# Patient Record
Sex: MALE | Race: WHITE | NOT HISPANIC OR LATINO | Employment: FULL TIME | URBAN - METROPOLITAN AREA
[De-identification: names, ages, dates, MRNs, and addresses within clinical notes are randomized per-mention and may not be internally consistent; named-entity substitution may affect disease eponyms.]

---

## 2017-07-24 ENCOUNTER — APPOINTMENT (OUTPATIENT)
Dept: LAB | Facility: CLINIC | Age: 52
End: 2017-07-24
Payer: COMMERCIAL

## 2017-07-24 ENCOUNTER — TRANSCRIBE ORDERS (OUTPATIENT)
Dept: URGENT CARE | Facility: CLINIC | Age: 52
End: 2017-07-24

## 2017-07-24 DIAGNOSIS — Z79.1 ENCOUNTER FOR LONG-TERM (CURRENT) USE OF NSAIDS: ICD-10-CM

## 2017-07-24 DIAGNOSIS — E56.9 UNSPECIFIED VITAMIN DEFICIENCY: ICD-10-CM

## 2017-07-24 DIAGNOSIS — R53.83 FATIGUE, UNSPECIFIED TYPE: Primary | ICD-10-CM

## 2017-07-24 DIAGNOSIS — N42.9 UNSPECIFIED DISORDER OF PROSTATE: ICD-10-CM

## 2017-07-24 LAB
25(OH)D3 SERPL-MCNC: 12.1 NG/ML (ref 30–100)
ALBUMIN SERPL BCP-MCNC: 3.6 G/DL (ref 3.5–5)
ALP SERPL-CCNC: 61 U/L (ref 46–116)
ALT SERPL W P-5'-P-CCNC: 23 U/L (ref 12–78)
ANION GAP SERPL CALCULATED.3IONS-SCNC: 10 MMOL/L (ref 4–13)
AST SERPL W P-5'-P-CCNC: 14 U/L (ref 5–45)
BILIRUB SERPL-MCNC: 0.34 MG/DL (ref 0.2–1)
BUN SERPL-MCNC: 29 MG/DL (ref 5–25)
CALCIUM SERPL-MCNC: 9.5 MG/DL (ref 8.3–10.1)
CHLORIDE SERPL-SCNC: 104 MMOL/L (ref 100–108)
CO2 SERPL-SCNC: 23 MMOL/L (ref 21–32)
CREAT SERPL-MCNC: 1.12 MG/DL (ref 0.6–1.3)
ERYTHROCYTE [DISTWIDTH] IN BLOOD BY AUTOMATED COUNT: 12.9 % (ref 11.6–15.1)
FOLATE SERPL-MCNC: 17 NG/ML (ref 3.1–17.5)
GFR SERPL CREATININE-BSD FRML MDRD: 75 ML/MIN/1.73SQ M
GLUCOSE SERPL-MCNC: 129 MG/DL (ref 65–140)
HCT VFR BLD AUTO: 42.2 % (ref 36.5–49.3)
HGB BLD-MCNC: 14.2 G/DL (ref 12–17)
MAGNESIUM SERPL-MCNC: 2.2 MG/DL (ref 1.6–2.6)
MCH RBC QN AUTO: 31 PG (ref 26.8–34.3)
MCHC RBC AUTO-ENTMCNC: 33.6 G/DL (ref 31.4–37.4)
MCV RBC AUTO: 92 FL (ref 82–98)
PLATELET # BLD AUTO: 188 THOUSANDS/UL (ref 149–390)
PMV BLD AUTO: 11.1 FL (ref 8.9–12.7)
POTASSIUM SERPL-SCNC: 4.5 MMOL/L (ref 3.5–5.3)
PROT SERPL-MCNC: 7.3 G/DL (ref 6.4–8.2)
PSA SERPL-MCNC: 0.3 NG/ML (ref 0–4)
RBC # BLD AUTO: 4.58 MILLION/UL (ref 3.88–5.62)
SODIUM SERPL-SCNC: 137 MMOL/L (ref 136–145)
T4 FREE SERPL-MCNC: 0.84 NG/DL (ref 0.76–1.46)
TSH SERPL DL<=0.05 MIU/L-ACNC: 0.85 UIU/ML (ref 0.36–3.74)
VIT B12 SERPL-MCNC: 241 PG/ML (ref 100–900)
WBC # BLD AUTO: 7.92 THOUSAND/UL (ref 4.31–10.16)

## 2017-07-24 PROCEDURE — 85027 COMPLETE CBC AUTOMATED: CPT

## 2017-07-24 PROCEDURE — 80053 COMPREHEN METABOLIC PANEL: CPT

## 2017-07-24 PROCEDURE — 84439 ASSAY OF FREE THYROXINE: CPT

## 2017-07-24 PROCEDURE — 82306 VITAMIN D 25 HYDROXY: CPT

## 2017-07-24 PROCEDURE — 84425 ASSAY OF VITAMIN B-1: CPT

## 2017-07-24 PROCEDURE — 84207 ASSAY OF VITAMIN B-6: CPT

## 2017-07-24 PROCEDURE — 82607 VITAMIN B-12: CPT

## 2017-07-24 PROCEDURE — 84443 ASSAY THYROID STIM HORMONE: CPT

## 2017-07-24 PROCEDURE — 84255 ASSAY OF SELENIUM: CPT

## 2017-07-24 PROCEDURE — 83735 ASSAY OF MAGNESIUM: CPT

## 2017-07-24 PROCEDURE — 36415 COLL VENOUS BLD VENIPUNCTURE: CPT

## 2017-07-24 PROCEDURE — G0103 PSA SCREENING: HCPCS

## 2017-07-24 PROCEDURE — 82746 ASSAY OF FOLIC ACID SERUM: CPT

## 2017-07-25 LAB — SELENIUM SERPL-MCNC: 169 UG/L (ref 79–326)

## 2017-07-27 LAB
VIT B1 BLD-SCNC: 190.9 NMOL/L (ref 66.5–200)
VIT B6 SERPL-MCNC: 19 UG/L (ref 5.3–46.7)

## 2018-01-19 ENCOUNTER — OFFICE VISIT (OUTPATIENT)
Dept: URGENT CARE | Facility: CLINIC | Age: 53
End: 2018-01-19
Payer: COMMERCIAL

## 2018-01-19 PROCEDURE — 99213 OFFICE O/P EST LOW 20 MIN: CPT

## 2018-01-23 VITALS
OXYGEN SATURATION: 97 % | HEART RATE: 105 BPM | DIASTOLIC BLOOD PRESSURE: 76 MMHG | TEMPERATURE: 99.6 F | RESPIRATION RATE: 18 BRPM | SYSTOLIC BLOOD PRESSURE: 122 MMHG

## 2018-01-23 NOTE — PROGRESS NOTES
Assessment    1  Acute frontal sinusitis (461 1) (J01 10)    Plan  Acute frontal sinusitis    · Amoxicillin-Pot Clavulanate 875-125 MG Oral Tablet; TAKE 1 TABLET EVERY 12  HOURS DAILY    Discussion/Summary  Discussion Summary: This has diagnosis of sinusitis we will treat with Augmentin p  o  b i d  for 10 days and follow up with PCP in 3-5 days  Medication Side Effects Reviewed: Possible side effects of new medications were reviewed with the patient/guardian today  Understands and agrees with treatment plan: The treatment plan was reviewed with the patient/guardian  The patient/guardian understands and agrees with the treatment plan   Counseling Documentation With Imm: The patient was counseled regarding instructions for management, patient and family education, importance of compliance with treatment  total time of encounter was 25 minutes and 10 minutes was spent counseling  Follow Up Instructions: Follow Up with your Primary Care Provider in 3-5 days  If your symptoms worsen, go to the nearest [place] [address] Emergency Department  Chief Complaint    1  Cold Symptoms  Chief Complaint Free Text Note Form: C/O cough, post nasal drip, tight in the chest, burning in the chest x 2 days  Pt did not have the Flu vaccine  History of Present Illness  HPI: 58-year-old male at urgent care with chief complaint of nasal congestion sinus pressure postnasal drainage cough for the past 4 days denies any fevers or chills   Hospital Based Practices Required Assessment:   Pain Assessment   the patient states they do not have pain  (on a scale of 0 to 10, the patient rates the pain at 0 )   Abuse And Domestic Violence Screen    Yes, the patient is safe at home  The patient states no one is hurting them  Depression And Suicide Screen  No, the patient has not had thoughts of hurting themself  No, the patient has not felt depressed in the past 7 days  Readiness To Learn: Receptive     Barriers To Learning: none    Preferred Learning: verbal   Education Completed: disease/condition, medications and further treatment/follow-up   Teaching Method: verbal   Person Taught: patient   Evaluation Of Learning: verbalized/demonstrated understanding   Cold Symptoms: Lola Hamper presents with complaints of cold symptoms  Associated symptoms include nasal congestion, runny nose, post nasal drainage, scratchy throat, dry cough and facial pressure, but no sneezing, no sore throat, no hoarseness, no productive cough, no facial pain, no headache, no plugged ear(s), no ear pain, no swollen lymph nodes, no wheezing, no shortness of breath, no fatigue, no weakness, no nausea, no vomiting, no diarrhea, no fever and no chills  Review of Systems  Focused-Male:   Constitutional: no fever or chills, feels well, no tiredness, no recent weight loss or weight gain  ENT: nasal discharge, but as noted in HPI  Cardiovascular: no complaints of slow or fast heart rate, no chest pain, no palpitations, no leg claudication or lower extremity edema  Respiratory: cough and PND, but as noted in HPI  Gastrointestinal: no complaints of abdominal pain, no constipation, no nausea or vomiting, no diarrhea or bloody stools  Genitourinary: no complaints of dysuria or incontinence, no hesitancy, no nocturia, no genital lesion, no inadequacy of penile erection  Musculoskeletal: no complaints of arthralgia, no myalgia, no joint swelling or stiffness, no limb pain or swelling  Integumentary: no complaints of skin rash or lesion, no itching or dry skin, no skin wounds  Neurological: no complaints of headache, no confusion, no numbness or tingling, no dizziness or fainting  ROS Reviewed:   ROS reviewed  Active Problems    1  Allergic rhinitis (477 9) (J30 9)   2  Anxiety (300 00) (F41 9)   3  Diabetes mellitus (250 00) (E11 9)   4  Hematuria (599 70) (R31 9)    Past Medical History    1  History of Anxiety (300 00) (F41 9)   2   History of Denial Of Any Significant Medical History   3  History of acute pharyngitis (V12 69) (Z87 09)   4  History of acute sinusitis (V12 69) (Z87 09)   5  History of allergy (V15 09) (Z88 9)   6  History of esophageal reflux (V12 79) (Z87 19)   7  History of sleep apnea (V13 89) (Z86 69)   8  History of Lightheadedness (780 4) (R42)   9  History of Skin rash (782 1) (R21)  Active Problems And Past Medical History Reviewed: The active problems and past medical history were reviewed and updated today  Family History  Family History    1  Family history of Diabetes Mellitus (V18 0)  Family History Reviewed: The family history was reviewed and updated today  Social History    · Denied: History of Current Every Day Smoker   · Former smoker (T66 96) (F58 900)  Social History Reviewed: The social history was reviewed and updated today  The social history was reviewed and is unchanged  Surgical History    1  History of Appendectomy   2  History of Sinus Surgery   3  History of Sinus Surgery  Surgical History Reviewed: The surgical history was reviewed and updated today  Current Meds   1  Lexapro TABS; Therapy: (Recorded:15Mgv9782) to Recorded  Medication List Reviewed: The medication list was reviewed and updated today  Allergies    1  No Known Drug Allergies    2  Animal dander   3  Dust   4  Mold   5  Seasonal   6  Trees    Vitals  Signs   Recorded: [id number] 01:40PM   Temperature: 99 6 F  Heart Rate: 105  Respiration: 18  Systolic: 408  Diastolic: 76  O2 Saturation: 97    Physical Exam    Constitutional   General appearance: No acute distress, well appearing and well nourished  Eyes   Conjunctiva and lids: No swelling, erythema, or discharge  Pupils and irises: Equal, round and reactive to light  Ears, Nose, Mouth, and Throat   External inspection of ears and nose: Normal     Otoscopic examination: Tympanic membrance translucent with normal light reflex   Canals patent without erythema  Nasal mucosa, septum, and turbinates: Abnormal   normal nasal septum, no intranasal masses or polyps and normal nasal turbinates  There was a purulent discharge from both nares  The bilateral nasal mucosa was boggy  Oropharynx: Abnormal   PND  Pulmonary   Respiratory effort: No increased work of breathing or signs of respiratory distress  Auscultation of lungs: Clear to auscultation  Cardiovascular   Palpation of heart: Normal PMI, no thrills  Auscultation of heart: Normal rate and rhythm, normal S1 and S2, without murmurs  Lymphatic   Palpation of lymph nodes in neck: No lymphadenopathy      Musculoskeletal   Gait and station: Normal     Psychiatric   Orientation to person, place and time: Normal     Mood and affect: Normal        Signatures   Electronically signed by : Gita Chou NP; Jan 19 2018  1:57PM EST                       (Author)

## 2018-05-05 ENCOUNTER — OFFICE VISIT (OUTPATIENT)
Dept: URGENT CARE | Facility: CLINIC | Age: 53
End: 2018-05-05
Payer: COMMERCIAL

## 2018-05-05 VITALS
SYSTOLIC BLOOD PRESSURE: 125 MMHG | RESPIRATION RATE: 16 BRPM | TEMPERATURE: 97.8 F | HEART RATE: 82 BPM | OXYGEN SATURATION: 100 % | DIASTOLIC BLOOD PRESSURE: 68 MMHG

## 2018-05-05 DIAGNOSIS — J06.9 ACUTE URI: Primary | ICD-10-CM

## 2018-05-05 PROCEDURE — 99213 OFFICE O/P EST LOW 20 MIN: CPT | Performed by: PHYSICIAN ASSISTANT

## 2018-05-05 RX ORDER — AZITHROMYCIN 250 MG/1
TABLET, FILM COATED ORAL
Qty: 6 TABLET | Refills: 0 | Status: SHIPPED | OUTPATIENT
Start: 2018-05-05 | End: 2018-05-10

## 2018-05-05 RX ORDER — ESCITALOPRAM OXALATE 20 MG/1
20 TABLET ORAL
COMMUNITY
Start: 2014-05-27

## 2018-05-05 NOTE — PROGRESS NOTES
[facility]    NAME: Joselyn Ramirez is a 47 y o  male  : 1965    MRN: [id number]  DATE: May 5, 2018  TIME: 9:31 AM    Assessment and Plan   Acute URI [J06 9]  1  Acute URI  azithromycin (ZITHROMAX) 250 mg tablet       Patient Instructions     Patient Instructions   I have prescribed an antibiotic for the infection  Please take the antibiotic as prescribed and finish the entire prescription  I recommend that the patient takes an over the counter probiotic or eats yogurt with live cultures in it [place]) to keep good bacteria in the gut and help prevent diarrhea  Wash hands frequently to prevent the spread of infection  Can use over the counter cough and cold medications to help with symptoms, recommend zyrtec  Ibuprofen and/or tylenol as needed for pain or fever  If not improving over the next 7-10 days, follow up with PCP  Chief Complaint     Chief Complaint   Patient presents with    Cold Like Symptoms     x 2 days    Cough    Sore Throat       History of Present Illness   54-year-old male here with complaint of sore throat, nasal congestion and sinus pressure for the last couple days  Patient states that he has been using a nasal wash/Neti pot and it has been giving him some relief but feels like he is getting worse  He denies any fever or chills  Nasal mucous is thick  Review of Systems   Review of Systems   Constitutional: Positive for fatigue  Negative for activity change, appetite change, chills, diaphoresis, fever and unexpected weight change  HENT: Positive for congestion, sinus pressure and sore throat  Negative for ear pain, hearing loss, sneezing and tinnitus  Eyes: Negative for photophobia, redness and visual disturbance  Respiratory: Positive for cough  Negative for apnea, chest tightness, shortness of breath, wheezing and stridor  Cardiovascular: Negative for chest pain, palpitations and leg swelling     Gastrointestinal: Negative for abdominal distention, abdominal pain, blood in stool, constipation, diarrhea, nausea and vomiting  Endocrine: Negative for cold intolerance, heat intolerance, polydipsia, polyphagia and polyuria  Genitourinary: Negative for difficulty urinating, dysuria, flank pain, hematuria and urgency  Musculoskeletal: Negative for arthralgias, back pain, gait problem, myalgias, neck pain and neck stiffness  Skin: Negative for rash and wound  Neurological: Negative for dizziness, tremors, seizures, syncope, weakness, light-headedness, numbness and headaches  Hematological: Negative for adenopathy  Does not bruise/bleed easily  Psychiatric/Behavioral: Negative for agitation, behavioral problems, confusion and decreased concentration  The patient is not nervous/anxious  All other systems reviewed and are negative  Current Medications     Current Outpatient Prescriptions:     escitalopram (LEXAPRO) 20 mg tablet, Take 20 mg by mouth, Disp: , Rfl:     azithromycin (ZITHROMAX) 250 mg tablet, Take 2 tablets today then 1 tablet daily for 4 days, Disp: 6 tablet, Rfl: 0    Current Allergies     Allergies as of 05/05/2018 - Reviewed 05/05/2018   Allergen Reaction Noted    Pollen extract  12/26/2013          The following portions of the patient's history were reviewed and updated as appropriate: allergies, current medications, past family history, past medical history, past social history, past surgical history and problem list    Past Medical History:   Diagnosis Date    Anxiety     Depression      No past surgical history on file  No family history on file  Medications have been verified  Objective   /68   Pulse 82   Temp 97 8 °F (36 6 °C)   Resp 16   SpO2 100%      Physical Exam   Physical Exam   Constitutional: He appears well-developed and well-nourished  No distress  HENT:   Head: Normocephalic     Right Ear: Tympanic membrane and external ear normal    Left Ear: Tympanic membrane and external ear normal    Nose: Mucosal edema present  Right sinus exhibits maxillary sinus tenderness and frontal sinus tenderness  Left sinus exhibits maxillary sinus tenderness and frontal sinus tenderness  Mouth/Throat: Posterior oropharyngeal erythema present  No oropharyngeal exudate  Neck: Normal range of motion  Neck supple  Cardiovascular: Normal rate, regular rhythm and normal heart sounds  No murmur heard  Pulmonary/Chest: Effort normal and breath sounds normal  No respiratory distress  He has no wheezes  He has no rales  Abdominal: Soft  Bowel sounds are normal  There is no tenderness  Musculoskeletal: Normal range of motion  Lymphadenopathy:     He has no cervical adenopathy  Skin: Skin is warm  No rash noted

## 2018-05-05 NOTE — PATIENT INSTRUCTIONS
I have prescribed an antibiotic for the infection  Please take the antibiotic as prescribed and finish the entire prescription  I recommend that the patient takes an over the counter probiotic or eats yogurt with live cultures in it [place]) to keep good bacteria in the gut and help prevent diarrhea  Wash hands frequently to prevent the spread of infection  Can use over the counter cough and cold medications to help with symptoms, recommend zyrtec  Ibuprofen and/or tylenol as needed for pain or fever  If not improving over the next 7-10 days, follow up with PCP

## 2018-06-04 ENCOUNTER — OFFICE VISIT (OUTPATIENT)
Dept: URGENT CARE | Facility: CLINIC | Age: 53
End: 2018-06-04
Payer: COMMERCIAL

## 2018-06-04 VITALS
OXYGEN SATURATION: 96 % | SYSTOLIC BLOOD PRESSURE: 116 MMHG | WEIGHT: 250 LBS | BODY MASS INDEX: 35 KG/M2 | DIASTOLIC BLOOD PRESSURE: 80 MMHG | RESPIRATION RATE: 20 BRPM | TEMPERATURE: 100.7 F | HEIGHT: 71 IN | HEART RATE: 104 BPM

## 2018-06-04 DIAGNOSIS — J06.9 ACUTE URI: Primary | ICD-10-CM

## 2018-06-04 PROCEDURE — 99213 OFFICE O/P EST LOW 20 MIN: CPT | Performed by: PHYSICIAN ASSISTANT

## 2018-06-04 RX ORDER — BENZONATATE 100 MG/1
100 CAPSULE ORAL 3 TIMES DAILY PRN
Qty: 20 CAPSULE | Refills: 0 | Status: SHIPPED | OUTPATIENT
Start: 2018-06-04

## 2018-06-04 RX ORDER — AZITHROMYCIN 250 MG/1
TABLET, FILM COATED ORAL
Qty: 6 TABLET | Refills: 0 | Status: SHIPPED | OUTPATIENT
Start: 2018-06-04 | End: 2018-06-08 | Stop reason: ALTCHOICE

## 2018-06-04 NOTE — PROGRESS NOTES
[facility]    NAME: Nathaniel Mann is a 46 y o  male  : 1965    MRN: [id number]  DATE: 2018  TIME: 8:48 AM    Assessment and Plan   Acute URI [J06 9]  1  Acute URI  azithromycin (ZITHROMAX) 250 mg tablet    benzonatate (TESSALON PERLES) 100 mg capsule       Patient Instructions     Patient Instructions   I have prescribed an antibiotic for the infection  Please take the antibiotic as prescribed and finish the entire prescription  I recommend that the patient takes an over the counter probiotic or eats yogurt with live cultures in it [place]) to keep good bacteria in the gut and help prevent diarrhea  Wash hands frequently to prevent the spread of infection  Can use over the counter cough and cold medications to help with symptoms  Ibuprofen and/or tylenol as needed for pain or fever  If not improving over the next 7-10 days, follow up with PCP  Chief Complaint     Chief Complaint   Patient presents with    Cold Like Symptoms     C/O sinus congestion, post nasal drip, cough , body aches and chills x 2 days  History of Present Illness   27-year-old male here with complaint of upper respiratory symptoms  Patient is a fever, sinus pressure and nasal congestion  Symptoms started 3 days ago  Has a productive cough with thick mucus  And sore throat  Review of Systems   Review of Systems   Constitutional: Positive for chills and fever  Negative for activity change, appetite change, diaphoresis, fatigue and unexpected weight change  HENT: Positive for congestion, postnasal drip, sinus pressure and sore throat  Negative for ear pain, hearing loss, sneezing and tinnitus  Eyes: Negative for photophobia, redness and visual disturbance  Respiratory: Positive for cough  Negative for apnea, chest tightness, shortness of breath, wheezing and stridor  Cardiovascular: Negative for chest pain, palpitations and leg swelling     Gastrointestinal: Negative for abdominal distention, abdominal pain, blood in stool, constipation, diarrhea, nausea and vomiting  Endocrine: Negative for cold intolerance, heat intolerance, polydipsia, polyphagia and polyuria  Genitourinary: Negative for difficulty urinating, dysuria, flank pain, hematuria and urgency  Musculoskeletal: Negative for arthralgias, back pain, gait problem, myalgias, neck pain and neck stiffness  Skin: Negative for rash and wound  Neurological: Negative for dizziness, tremors, seizures, syncope, weakness, light-headedness, numbness and headaches  Hematological: Negative for adenopathy  Does not bruise/bleed easily  Psychiatric/Behavioral: Negative for agitation, behavioral problems, confusion and decreased concentration  The patient is not nervous/anxious  All other systems reviewed and are negative  Current Medications     Current Outpatient Prescriptions:     azithromycin (ZITHROMAX) 250 mg tablet, Take 2 tablets today then 1 tablet daily for 4 days, Disp: 6 tablet, Rfl: 0    benzonatate (TESSALON PERLES) 100 mg capsule, Take 1 capsule (100 mg total) by mouth 3 (three) times a day as needed for cough, Disp: 20 capsule, Rfl: 0    escitalopram (LEXAPRO) 20 mg tablet, Take 20 mg by mouth, Disp: , Rfl:     Current Allergies     Allergies as of 06/04/2018 - Reviewed 06/04/2018   Allergen Reaction Noted    Pollen extract  12/26/2013          The following portions of the patient's history were reviewed and updated as appropriate: allergies, current medications, past family history, past medical history, past social history, past surgical history and problem list    Past Medical History:   Diagnosis Date    Anxiety     Depression     Diabetes mellitus ([facility] [address] )      Past Surgical History:   Procedure Laterality Date    APPENDECTOMY      NASAL POLYP EXCISION      x 2      No family history on file  Medications have been verified      Objective   /80   Pulse 104   Temp (!) 100 7 °F (38 2 °C) (Tympanic)   Resp 20   Ht 5' 11" (1 803 m)   Wt 113 kg (250 lb)   SpO2 96%   BMI 34 87 kg/m²      Physical Exam   Physical Exam   Constitutional: He appears well-developed and well-nourished  No distress  HENT:   Head: Normocephalic  Right Ear: Tympanic membrane and external ear normal    Left Ear: Tympanic membrane and external ear normal    Nose: Mucosal edema present  Right sinus exhibits maxillary sinus tenderness  Mouth/Throat: Posterior oropharyngeal erythema present  No oropharyngeal exudate  Neck: Normal range of motion  Neck supple  Cardiovascular: Normal rate, regular rhythm and normal heart sounds  No murmur heard  Pulmonary/Chest: Effort normal and breath sounds normal  No respiratory distress  He has no wheezes  He has no rales  Abdominal: Soft  Bowel sounds are normal  There is no tenderness  Musculoskeletal: Normal range of motion  Lymphadenopathy:     He has no cervical adenopathy  Skin: Skin is warm  No rash noted

## 2018-06-08 ENCOUNTER — OFFICE VISIT (OUTPATIENT)
Dept: URGENT CARE | Facility: CLINIC | Age: 53
End: 2018-06-08
Payer: COMMERCIAL

## 2018-06-08 ENCOUNTER — APPOINTMENT (OUTPATIENT)
Dept: RADIOLOGY | Facility: CLINIC | Age: 53
End: 2018-06-08
Payer: COMMERCIAL

## 2018-06-08 VITALS
WEIGHT: 250 LBS | HEIGHT: 71 IN | SYSTOLIC BLOOD PRESSURE: 130 MMHG | RESPIRATION RATE: 20 BRPM | DIASTOLIC BLOOD PRESSURE: 80 MMHG | BODY MASS INDEX: 35 KG/M2 | HEART RATE: 77 BPM | TEMPERATURE: 98 F | OXYGEN SATURATION: 100 %

## 2018-06-08 DIAGNOSIS — R05.9 COUGH: ICD-10-CM

## 2018-06-08 DIAGNOSIS — J40 BRONCHITIS: ICD-10-CM

## 2018-06-08 DIAGNOSIS — R05.9 COUGH: Primary | ICD-10-CM

## 2018-06-08 PROCEDURE — 99213 OFFICE O/P EST LOW 20 MIN: CPT | Performed by: FAMILY MEDICINE

## 2018-06-08 PROCEDURE — 71046 X-RAY EXAM CHEST 2 VIEWS: CPT

## 2018-06-08 RX ORDER — IPRATROPIUM BROMIDE AND ALBUTEROL SULFATE 2.5; .5 MG/3ML; MG/3ML
3 SOLUTION RESPIRATORY (INHALATION)
Status: SHIPPED | OUTPATIENT
Start: 2018-06-08

## 2018-06-08 RX ORDER — ALBUTEROL SULFATE 90 UG/1
2 AEROSOL, METERED RESPIRATORY (INHALATION) EVERY 4 HOURS PRN
Qty: 1 INHALER | Refills: 0 | Status: SHIPPED | OUTPATIENT
Start: 2018-06-08 | End: 2018-07-08

## 2018-06-08 RX ORDER — LEVOFLOXACIN 500 MG/1
500 TABLET, FILM COATED ORAL DAILY
Qty: 10 TABLET | Refills: 0 | Status: SHIPPED | OUTPATIENT
Start: 2018-06-08 | End: 2018-06-18

## 2018-06-08 RX ADMIN — IPRATROPIUM BROMIDE AND ALBUTEROL SULFATE 3 ML: 2.5; .5 SOLUTION RESPIRATORY (INHALATION) at 08:56

## 2018-06-08 RX ADMIN — IPRATROPIUM BROMIDE AND ALBUTEROL SULFATE 3 ML: 2.5; .5 SOLUTION RESPIRATORY (INHALATION) at 08:54

## 2018-06-08 NOTE — PROGRESS NOTES
[facility] - Patient Visit Note  Cuauhtemoc Wilcox 46 y o  male MRN: [id number]      Assessment / Plan:  Cough [R05]  1  Cough  XR chest pa & lateral     Reason For Visit / Chief Complaint  Chief Complaint   Patient presents with    Cold Like Symptoms     started monday             Georgette Ormond Discussion:  Patient is being treated for early left lower lobe pneumonia please see instructions     HPI:  Cuauhtemoc Wilcox is a 46 y o  male Patient           Who  Presents after having been seen recently and put on azithromycin  He was also on [employer]  Since that time will was an upper respiratory illness has come down to his chest   He is now hearing what  he describes as rattling  He is aware of wheezing- which is not common for him and states that even small amounts of activity creates shortness of breath  He does He denie recall that 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 and there were several people with pneumonia there  He was also attic doing photography where he was sweating for several hours in extremely warm area  His sputum is minimal but when it is present is yellowish in nature  There is no history of asthma in the past   He denies any pleuritic pain fever or chills  He is allergic to no foods or medications medication list is reviewed    Chest x-ray will be obtained today based on physical exam        Problem List     Anxiety    Depressive disorder    Diabetes mellitus ([facility] [address] )    Esophageal reflux    Hematuria    Panic disorder    Type II diabetes mellitus, uncontrolled ([address] )    Type II or unspecified type diabetes mellitus without mention of complication, uncontrolled          Historical Information   Past Medical History:   Diagnosis Date    Anxiety     Depression     Diabetes mellitus ([facility] [address] )      Past Surgical History:   Procedure Laterality Date    APPENDECTOMY      NASAL POLYP EXCISION      x 2      Social History   History   Alcohol use Not on file     History   Drug use: Unknown     History   Smoking Status    Former Smoker   Smokeless Tobacco    Never Used     No family history on file        ALLERGIES:         Allergies   Allergen Reactions    Pollen Extract        MEDS:    Current Outpatient Prescriptions:     benzonatate (TESSALON PERLES) 100 mg capsule, Take 1 capsule (100 mg total) by mouth 3 (three) times a day as needed for cough, Disp: 20 capsule, Rfl: 0    escitalopram (LEXAPRO) 20 mg tablet, Take 20 mg by mouth, Disp: , Rfl:     FACILITY ADMINISTERED MEDS:        REVIEW OF SYSTEMS    GENERAL: NEGATIVE for:  Generalized Fatigue                             Chills                              Fever                             Myalgias     OPTHALMIC: NEGATIVE for:  Diplopia                            Scotomata                            Visual Changes                            Blurred Vision     ENT:  [address] for:  Hearing Difficulty                            Tinnitus                            Vertigo                            Dizziness                            Ear Pain                            Ear Drainage               NOSE NEGATIVE for:  Nasal Congestion                            Nasal Discharge                            Sinus Pain / Pressure               THROAT NEGATIVE for:  Sore Throat / Throat Pain                            Difficulty Swallowing     RESPIRATORY: Positive for:  Cough                            Wheezing                                                        Sob / Tachypnea                              CARDIOVASCULAR: NEGATIVE for:  Chest Pain                             SOB (cardiac Related)                             Dyspnea on Exertion                             Orthopnea                             PND                             Leg Edema                             Palpitations Irregularities/rythym                       CURRENT VITALS:   Blood Pressure: 130/80 (06/08/18 0818)  Pulse: 77 (06/08/18 0818)  Temperature: 98 °F (36 7 °C) (06/08/18 0818)  Temp Source: Tympanic (06/08/18 0818)  Respirations: 20 (06/08/18 0818)  Height: 5' 11" (180 3 cm) (06/08/18 0818)  Weight - Scale: 113 kg (250 lb) (06/08/18 0818)  SpO2: 100 % (06/08/18 0818)  /80 (BP Location: Right arm, Patient Position: Sitting, Cuff Size: Large)   Pulse 77   Temp 98 °F (36 7 °C) (Tympanic)   Resp 20   Ht 5' 11" (1 803 m)   Wt 113 kg (250 lb)   SpO2 100%   BMI 34 87 kg/m²       PHYSICAL EXAM:         General Appearance:    Alert, cooperative, no apparent distress, appears stated age     Oriented x3    Head:    Normocephalic, without obvious abnormality, atraumatic   Eyes:      EOM's intact,      JOSE,        conjunctiva/corneas clear,          fundi not visualized well   Ears:     Normal external ear canals     Tm right side  Normal     Tm left side    Normal       Nose:   Nares normal externally, septum midline,     mucosa normal,     No anterior drainage         Sinuses   with out   tenderness to palpation / percussion     Throat:   Lips, mucosa, and tongue normal       Anterior pharynx   Normal      Posterior pharynx moderate amount of postnasal drip greenish in nature     No exudate obvious       Neck:   Supple, symmetrical, trachea midline and moveable    Normal thyroid click present    No carotid bruits appreciated        Lymphatics:     Adenopathy in anterior cervical chain  Normal    Adenopathy in posterior cervical chain   Normal     Lungs:   Bilateral inspiratory rhonchi are noted at the bases wheezing is noted throughout all lung fields no rales are appreciated     Heart[de-identified]  Regular rate and rhythm, S1 and S2 normal,     No S3, S4, audible    No murmurs, rubs      Extremities:     Extremities grossly normal     atraumatic,     no cyanosis or edema        Skin:     Skin color, texture, turgor normal, no rashes or lesions                         Follow up at primary care in 2  days    Counseling / Coordination of Care  Total clinic time spent today  15  minutes  Greater than 50% of total time was spent with the patient and / or family counseling and / or coordination of care  Portions of the record may have been created with voice recognition software   Occasional wrong word or "sound a like" substitutions may have occurred due to the inherent limitations of voice recognition software   Read the chart carefully and recognize, using context, where substitutions have occurred

## 2018-06-08 NOTE — PATIENT INSTRUCTIONS
Chest x-ray was read by Radiology with the [facility] rotation that there is early and developing left lower lobe pneumonia  Please continue to take the Levaquin (new antibiotic) once a day for 10 full days  Discontinue the azithromycin  Discontinue the [employer]  Use your inhaler 4 times a day for several days and then p r n  as needed if her symptoms become worse in the next 48 hours please see got the emergency room and/or primary care for re-evaluation

## 2018-08-28 ENCOUNTER — APPOINTMENT (OUTPATIENT)
Dept: LAB | Facility: CLINIC | Age: 53
End: 2018-08-28
Payer: COMMERCIAL

## 2018-08-28 ENCOUNTER — TRANSCRIBE ORDERS (OUTPATIENT)
Dept: LAB | Facility: CLINIC | Age: 53
End: 2018-08-28

## 2018-08-28 DIAGNOSIS — R42 VERTIGO: ICD-10-CM

## 2018-08-28 DIAGNOSIS — E11.9 TYPE 2 DIABETES MELLITUS WITHOUT COMPLICATION, WITHOUT LONG-TERM CURRENT USE OF INSULIN (HCC): Primary | ICD-10-CM

## 2018-08-28 DIAGNOSIS — R21 RASH: ICD-10-CM

## 2018-08-28 DIAGNOSIS — N42.9 DISEASE OF PROSTATE: ICD-10-CM

## 2018-08-28 DIAGNOSIS — Z79.1 ENCOUNTER FOR LONG-TERM (CURRENT) USE OF NON-STEROIDAL ANTI-INFLAMMATORIES: ICD-10-CM

## 2018-08-28 LAB
ALBUMIN SERPL BCP-MCNC: 3.5 G/DL (ref 3.5–5)
ALP SERPL-CCNC: 62 U/L (ref 46–116)
ALT SERPL W P-5'-P-CCNC: 21 U/L (ref 12–78)
ANION GAP SERPL CALCULATED.3IONS-SCNC: 8 MMOL/L (ref 4–13)
AST SERPL W P-5'-P-CCNC: 13 U/L (ref 5–45)
BILIRUB SERPL-MCNC: 0.45 MG/DL (ref 0.2–1)
BUN SERPL-MCNC: 31 MG/DL (ref 5–25)
CALCIUM SERPL-MCNC: 9.3 MG/DL (ref 8.3–10.1)
CHLORIDE SERPL-SCNC: 108 MMOL/L (ref 100–108)
CO2 SERPL-SCNC: 24 MMOL/L (ref 21–32)
CREAT SERPL-MCNC: 1.19 MG/DL (ref 0.6–1.3)
ERYTHROCYTE [DISTWIDTH] IN BLOOD BY AUTOMATED COUNT: 13.3 % (ref 11.6–15.1)
ERYTHROCYTE [SEDIMENTATION RATE] IN BLOOD: 11 MM/HOUR (ref 0–10)
GFR SERPL CREATININE-BSD FRML MDRD: 69 ML/MIN/1.73SQ M
GLUCOSE P FAST SERPL-MCNC: 107 MG/DL (ref 65–99)
HCT VFR BLD AUTO: 39.9 % (ref 36.5–49.3)
HGB BLD-MCNC: 13 G/DL (ref 12–17)
MCH RBC QN AUTO: 31 PG (ref 26.8–34.3)
MCHC RBC AUTO-ENTMCNC: 32.6 G/DL (ref 31.4–37.4)
MCV RBC AUTO: 95 FL (ref 82–98)
PLATELET # BLD AUTO: 193 THOUSANDS/UL (ref 149–390)
PMV BLD AUTO: 11 FL (ref 8.9–12.7)
POTASSIUM SERPL-SCNC: 4.7 MMOL/L (ref 3.5–5.3)
PROT SERPL-MCNC: 7.1 G/DL (ref 6.4–8.2)
PSA SERPL-MCNC: 0.2 NG/ML (ref 0–4)
RBC # BLD AUTO: 4.19 MILLION/UL (ref 3.88–5.62)
SODIUM SERPL-SCNC: 140 MMOL/L (ref 136–145)
WBC # BLD AUTO: 6.3 THOUSAND/UL (ref 4.31–10.16)

## 2018-08-28 PROCEDURE — 80053 COMPREHEN METABOLIC PANEL: CPT

## 2018-08-28 PROCEDURE — 85027 COMPLETE CBC AUTOMATED: CPT

## 2018-08-28 PROCEDURE — 86618 LYME DISEASE ANTIBODY: CPT

## 2018-08-28 PROCEDURE — 84403 ASSAY OF TOTAL TESTOSTERONE: CPT

## 2018-08-28 PROCEDURE — 84443 ASSAY THYROID STIM HORMONE: CPT

## 2018-08-28 PROCEDURE — 84550 ASSAY OF BLOOD/URIC ACID: CPT

## 2018-08-28 PROCEDURE — 84439 ASSAY OF FREE THYROXINE: CPT

## 2018-08-28 PROCEDURE — G0103 PSA SCREENING: HCPCS

## 2018-08-28 PROCEDURE — 80061 LIPID PANEL: CPT

## 2018-08-28 PROCEDURE — 85652 RBC SED RATE AUTOMATED: CPT

## 2018-08-28 PROCEDURE — 36415 COLL VENOUS BLD VENIPUNCTURE: CPT

## 2018-08-29 LAB
B BURGDOR IGG SER IA-ACNC: 0.31
B BURGDOR IGM SER IA-ACNC: 0.3

## 2018-08-30 ENCOUNTER — APPOINTMENT (OUTPATIENT)
Dept: LAB | Facility: CLINIC | Age: 53
End: 2018-08-30
Payer: COMMERCIAL

## 2018-08-30 ENCOUNTER — TRANSCRIBE ORDERS (OUTPATIENT)
Dept: LAB | Facility: CLINIC | Age: 53
End: 2018-08-30

## 2018-08-30 DIAGNOSIS — E34.9 TESTOSTERONE DEFICIENCY: ICD-10-CM

## 2018-08-30 DIAGNOSIS — E11.9 TYPE 2 DIABETES MELLITUS WITHOUT COMPLICATION, WITHOUT LONG-TERM CURRENT USE OF INSULIN (HCC): Primary | ICD-10-CM

## 2018-08-30 DIAGNOSIS — M10.9 GOUT, UNSPECIFIED CAUSE, UNSPECIFIED CHRONICITY, UNSPECIFIED SITE: ICD-10-CM

## 2018-08-30 DIAGNOSIS — E03.9 HYPOTHYROIDISM, UNSPECIFIED TYPE: ICD-10-CM

## 2018-08-30 DIAGNOSIS — R53.83 FATIGUE, UNSPECIFIED TYPE: ICD-10-CM

## 2018-08-30 LAB
CHOLEST SERPL-MCNC: 156 MG/DL (ref 50–200)
CREAT UR-MCNC: 75.9 MG/DL
HDLC SERPL-MCNC: 50 MG/DL (ref 40–60)
LDLC SERPL CALC-MCNC: 62 MG/DL (ref 0–100)
MICROALBUMIN UR-MCNC: <5 MG/L (ref 0–20)
MICROALBUMIN/CREAT 24H UR: <7 MG/G CREATININE (ref 0–30)
NONHDLC SERPL-MCNC: 106 MG/DL
T4 FREE SERPL-MCNC: 0.83 NG/DL (ref 0.76–1.46)
TESTOST SERPL-MCNC: 155 NG/DL (ref 95–948)
TRIGL SERPL-MCNC: 221 MG/DL
TSH SERPL DL<=0.05 MIU/L-ACNC: 0.67 UIU/ML (ref 0.36–3.74)
URATE SERPL-MCNC: 12.1 MG/DL (ref 4.2–8)

## 2018-08-30 PROCEDURE — 82570 ASSAY OF URINE CREATININE: CPT

## 2018-08-30 PROCEDURE — 82043 UR ALBUMIN QUANTITATIVE: CPT

## 2019-08-28 ENCOUNTER — TRANSCRIBE ORDERS (OUTPATIENT)
Dept: LAB | Facility: CLINIC | Age: 54
End: 2019-08-28

## 2019-08-28 ENCOUNTER — APPOINTMENT (OUTPATIENT)
Dept: LAB | Facility: CLINIC | Age: 54
End: 2019-08-28
Payer: COMMERCIAL

## 2019-08-28 DIAGNOSIS — Z29.11 NEED FOR RSV IMMUNIZATION: ICD-10-CM

## 2019-08-28 DIAGNOSIS — E11.9 DIABETES MELLITUS WITHOUT COMPLICATION (HCC): ICD-10-CM

## 2019-08-28 DIAGNOSIS — E11.9 DIABETES MELLITUS WITHOUT COMPLICATION (HCC): Primary | ICD-10-CM

## 2019-08-28 DIAGNOSIS — M10.9 GOUT, UNSPECIFIED CAUSE, UNSPECIFIED CHRONICITY, UNSPECIFIED SITE: ICD-10-CM

## 2019-08-28 DIAGNOSIS — E55.9 VITAMIN D DEFICIENCY: ICD-10-CM

## 2019-08-28 DIAGNOSIS — Z12.5 SPECIAL SCREENING FOR MALIGNANT NEOPLASM OF PROSTATE: ICD-10-CM

## 2019-08-28 LAB
25(OH)D3 SERPL-MCNC: 15.7 NG/ML (ref 30–100)
ALBUMIN SERPL BCP-MCNC: 3.7 G/DL (ref 3.5–5)
ALP SERPL-CCNC: 66 U/L (ref 46–116)
ALT SERPL W P-5'-P-CCNC: 29 U/L (ref 12–78)
ANION GAP SERPL CALCULATED.3IONS-SCNC: 6 MMOL/L (ref 4–13)
AST SERPL W P-5'-P-CCNC: 15 U/L (ref 5–45)
BILIRUB SERPL-MCNC: 0.37 MG/DL (ref 0.2–1)
BUN SERPL-MCNC: 22 MG/DL (ref 5–25)
CALCIUM SERPL-MCNC: 9.1 MG/DL (ref 8.3–10.1)
CHLORIDE SERPL-SCNC: 106 MMOL/L (ref 100–108)
CHOLEST SERPL-MCNC: 162 MG/DL (ref 50–200)
CO2 SERPL-SCNC: 27 MMOL/L (ref 21–32)
CREAT SERPL-MCNC: 1.26 MG/DL (ref 0.6–1.3)
CREAT UR-MCNC: 155 MG/DL
ERYTHROCYTE [DISTWIDTH] IN BLOOD BY AUTOMATED COUNT: 12.7 % (ref 11.6–15.1)
EST. AVERAGE GLUCOSE BLD GHB EST-MCNC: 154 MG/DL
GFR SERPL CREATININE-BSD FRML MDRD: 64 ML/MIN/1.73SQ M
GLUCOSE P FAST SERPL-MCNC: 138 MG/DL (ref 65–99)
HBA1C MFR BLD: 7 % (ref 4.2–6.3)
HCT VFR BLD AUTO: 43 % (ref 36.5–49.3)
HDLC SERPL-MCNC: 44 MG/DL (ref 40–60)
HGB BLD-MCNC: 14.2 G/DL (ref 12–17)
LDLC SERPL CALC-MCNC: 55 MG/DL (ref 0–100)
MCH RBC QN AUTO: 30.8 PG (ref 26.8–34.3)
MCHC RBC AUTO-ENTMCNC: 33 G/DL (ref 31.4–37.4)
MCV RBC AUTO: 93 FL (ref 82–98)
MICROALBUMIN UR-MCNC: 5.8 MG/L (ref 0–20)
MICROALBUMIN/CREAT 24H UR: 4 MG/G CREATININE (ref 0–30)
NONHDLC SERPL-MCNC: 118 MG/DL
PLATELET # BLD AUTO: 161 THOUSANDS/UL (ref 149–390)
PMV BLD AUTO: 10.9 FL (ref 8.9–12.7)
POTASSIUM SERPL-SCNC: 4.6 MMOL/L (ref 3.5–5.3)
PROT SERPL-MCNC: 7 G/DL (ref 6.4–8.2)
PSA SERPL-MCNC: 0.2 NG/ML (ref 0–4)
RBC # BLD AUTO: 4.61 MILLION/UL (ref 3.88–5.62)
SODIUM SERPL-SCNC: 139 MMOL/L (ref 136–145)
TRIGL SERPL-MCNC: 314 MG/DL
URATE SERPL-MCNC: 11 MG/DL (ref 4.2–8)
WBC # BLD AUTO: 7.03 THOUSAND/UL (ref 4.31–10.16)

## 2019-08-28 PROCEDURE — G0103 PSA SCREENING: HCPCS

## 2019-08-28 PROCEDURE — 85027 COMPLETE CBC AUTOMATED: CPT

## 2019-08-28 PROCEDURE — 80053 COMPREHEN METABOLIC PANEL: CPT

## 2019-08-28 PROCEDURE — 84550 ASSAY OF BLOOD/URIC ACID: CPT

## 2019-08-28 PROCEDURE — 82306 VITAMIN D 25 HYDROXY: CPT

## 2019-08-28 PROCEDURE — 82043 UR ALBUMIN QUANTITATIVE: CPT | Performed by: INTERNAL MEDICINE

## 2019-08-28 PROCEDURE — 80061 LIPID PANEL: CPT

## 2019-08-28 PROCEDURE — 82570 ASSAY OF URINE CREATININE: CPT | Performed by: INTERNAL MEDICINE

## 2019-08-28 PROCEDURE — 83036 HEMOGLOBIN GLYCOSYLATED A1C: CPT

## 2019-08-28 PROCEDURE — 36415 COLL VENOUS BLD VENIPUNCTURE: CPT

## 2021-03-30 DIAGNOSIS — Z23 ENCOUNTER FOR IMMUNIZATION: ICD-10-CM

## 2022-05-05 PROBLEM — Z00.00 ENCOUNTER FOR PREVENTIVE HEALTH EXAMINATION: Status: ACTIVE | Noted: 2022-05-05

## 2022-05-16 ENCOUNTER — APPOINTMENT (OUTPATIENT)
Dept: ORTHOPEDIC SURGERY | Facility: CLINIC | Age: 57
End: 2022-05-16
Payer: COMMERCIAL

## 2022-05-16 VITALS — HEIGHT: 72 IN | WEIGHT: 200 LBS | BODY MASS INDEX: 27.09 KG/M2

## 2022-05-16 DIAGNOSIS — M75.52 BURSITIS OF LEFT SHOULDER: ICD-10-CM

## 2022-05-16 PROCEDURE — 99213 OFFICE O/P EST LOW 20 MIN: CPT

## 2022-05-16 RX ORDER — METHYLPREDNISOLONE 4 MG/1
4 TABLET ORAL
Qty: 1 | Refills: 1 | Status: ACTIVE | COMMUNITY
Start: 2022-05-16 | End: 1900-01-01

## 2022-05-17 NOTE — PHYSICAL EXAM
[Normal Mood and Affect] : normal mood and affect [Orientated] : orientated [Able to Communicate] : able to communicate [Well Developed] : well developed [Well Nourished] : well nourished [Left] : left shoulder [NL (0-180)] : full passive forward flexion 0-180 degrees [NL (0-90)] : full external rotation 0-90 degrees [] : no tenderness at lateral shoulder [FreeTextEntry9] : IR T12 [de-identified] : +Mikael

## 2022-05-17 NOTE — DATA REVIEWED
[MRI] : MRI [Left] : left [Shoulder] : shoulder [Report was reviewed and noted in the chart] : The report was reviewed and noted in the chart [FreeTextEntry1] : Partial rotator cuff tears

## 2022-05-17 NOTE — HISTORY OF PRESENT ILLNESS
[de-identified] : 05/6/22:  Returns today for left shoulder MRI results 2+ months after cortisone injection. Still c/o persistent lt shoulder pain despite recent cortisone injections x 2.\par \par IMPRESSION:  05/09/22\par 1.  Motion limited study.\par 2.  Moderate infraspinatus and mild-moderate supraspinatus tendinosis with partial tearing/fraying. Mild chronic proximal biceps tendinosis. Degenerative changes.\par 3.  Mild subacromial-subdeltoid bursitis.\par \par 03/01/22: Returns today about two weeks after left shoulder cortisone injection and says minimal improvement. Still painful to lay on his arm. 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 No hx of trauma. Constant and daily. Worse with overhead activity. Has wake up pain at night. Tried Motrin 400 mg prn without relief.

## 2022-06-10 ENCOUNTER — LAB REQUISITION (OUTPATIENT)
Dept: LAB | Facility: HOSPITAL | Age: 57
End: 2022-06-10
Payer: COMMERCIAL

## 2022-06-10 DIAGNOSIS — K63.5 POLYP OF COLON: ICD-10-CM

## 2022-06-10 PROCEDURE — 88305 TISSUE EXAM BY PATHOLOGIST: CPT | Performed by: PATHOLOGY

## 2022-10-26 ENCOUNTER — APPOINTMENT (OUTPATIENT)
Dept: ORTHOPEDIC SURGERY | Facility: CLINIC | Age: 57
End: 2022-10-26

## 2022-11-02 ENCOUNTER — APPOINTMENT (OUTPATIENT)
Dept: ORTHOPEDIC SURGERY | Facility: CLINIC | Age: 57
End: 2022-11-02
Payer: COMMERCIAL

## 2022-11-02 VITALS — BODY MASS INDEX: 28.44 KG/M2 | WEIGHT: 210 LBS | HEIGHT: 72 IN

## 2022-11-02 DIAGNOSIS — I10 ESSENTIAL (PRIMARY) HYPERTENSION: ICD-10-CM

## 2022-11-02 PROCEDURE — 73010 X-RAY EXAM OF SHOULDER BLADE: CPT | Mod: LT

## 2022-11-02 PROCEDURE — 99204 OFFICE O/P NEW MOD 45 MIN: CPT

## 2022-11-02 PROCEDURE — 20611 DRAIN/INJ JOINT/BURSA W/US: CPT | Mod: RT

## 2022-11-02 PROCEDURE — 73030 X-RAY EXAM OF SHOULDER: CPT | Mod: LT

## 2022-11-04 ENCOUNTER — APPOINTMENT (OUTPATIENT)
Dept: ORTHOPEDIC SURGERY | Facility: CLINIC | Age: 57
End: 2022-11-04

## 2022-12-07 ENCOUNTER — APPOINTMENT (OUTPATIENT)
Dept: ORTHOPEDIC SURGERY | Facility: CLINIC | Age: 57
End: 2022-12-07

## 2022-12-07 VITALS — HEIGHT: 72 IN | BODY MASS INDEX: 28.44 KG/M2 | WEIGHT: 210 LBS

## 2022-12-07 DIAGNOSIS — M75.02 ADHESIVE CAPSULITIS OF LEFT SHOULDER: ICD-10-CM

## 2022-12-07 PROCEDURE — 99214 OFFICE O/P EST MOD 30 MIN: CPT

## 2022-12-09 LAB
LEFT EYE DIABETIC RETINOPATHY: NORMAL
RIGHT EYE DIABETIC RETINOPATHY: NORMAL

## 2022-12-14 ENCOUNTER — RESULT REVIEW (OUTPATIENT)
Age: 57
End: 2022-12-14

## 2022-12-21 ENCOUNTER — APPOINTMENT (OUTPATIENT)
Dept: ORTHOPEDIC SURGERY | Facility: CLINIC | Age: 57
End: 2022-12-21

## 2022-12-23 ENCOUNTER — APPOINTMENT (OUTPATIENT)
Dept: ORTHOPEDIC SURGERY | Facility: CLINIC | Age: 57
End: 2022-12-23

## 2022-12-23 VITALS — BODY MASS INDEX: 29.12 KG/M2 | HEIGHT: 72 IN | WEIGHT: 215 LBS

## 2022-12-23 DIAGNOSIS — M75.112 INCOMPLETE ROTATOR CUFF TEAR OR RUPTURE OF LEFT SHOULDER, NOT SPECIFIED AS TRAUMATIC: ICD-10-CM

## 2022-12-23 PROCEDURE — 99214 OFFICE O/P EST MOD 30 MIN: CPT

## 2022-12-23 NOTE — PHYSICAL EXAM
[Left] : left shoulder [Moderate] : moderate [Mild] : mild [5 ___] : forward flexion 5[unfilled]/5 [5___] : external rotation 5[unfilled]/5 [Right] : right shoulder [Sitting] : sitting [] : no sensory deficits [FreeTextEntry3] : There is slight posterior atrophy.  [FreeTextEntry8] : This is slight.  [de-identified] : Bellypress is negative.  [FreeTextEntry9] : IR to T8. [TWNoteComboBox6] : internal rotation L1 [TWNoteComboBox4] : passive forward flexion 150 degrees [de-identified] : external rotation 75 degrees

## 2022-12-23 NOTE — REASON FOR VISIT
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 He saw Dr. Eliezer Farley. He has had two CSI to subacromial space with minor relief and an MRI was done. He was diagnosed with an incomplete rotator cuff tear and bursitis. Reaching is painful. Night symptoms can occur. There is intermittent n/t. NSAID use as needed with temporary relief. He does not feel he is getting better with PT.  The MRI was done.

## 2022-12-23 NOTE — DATA REVIEWED
[MRI] : MRI [Left] : left [I independently reviewed and interpreted images and report] : I independently reviewed and interpreted images and report [FreeTextEntry1] : MRI L SHOULDER 12/16/22: There is sightly further tendon enlargement. There is biceps degeneration. There are mild AC changes. The muscle is decent. Labral tears noted with GH changes.

## 2022-12-23 NOTE — HISTORY OF PRESENT ILLNESS
[7] : 7 [0] : 0 [de-identified] : pt is here today for a test follow up for his left shoulder. pt states his pain is similar to last visit  [FreeTextEntry1] : left shoulder  [de-identified] : motrin as needed

## 2022-12-23 NOTE — ASSESSMENT
[FreeTextEntry1] : Course outlined. \par Surgical options advised. \par Brief surgical outlook reviewed. \par Not interested in proceeding. He would like to be able to play golf\par OTC NSAIDs and HEP encouraged. \par Questions answered. \par RTO 3 months. Advised to follow up with his wife to discuss surgery.

## 2023-03-24 ENCOUNTER — APPOINTMENT (OUTPATIENT)
Dept: ORTHOPEDIC SURGERY | Facility: CLINIC | Age: 58
End: 2023-03-24

## 2023-06-10 ENCOUNTER — OFFICE VISIT (OUTPATIENT)
Dept: URGENT CARE | Facility: CLINIC | Age: 58
End: 2023-06-10
Payer: COMMERCIAL

## 2023-06-10 VITALS
SYSTOLIC BLOOD PRESSURE: 145 MMHG | DIASTOLIC BLOOD PRESSURE: 71 MMHG | OXYGEN SATURATION: 97 % | TEMPERATURE: 98.1 F | HEART RATE: 84 BPM | RESPIRATION RATE: 18 BRPM

## 2023-06-10 DIAGNOSIS — J01.40 ACUTE NON-RECURRENT PANSINUSITIS: Primary | ICD-10-CM

## 2023-06-10 PROCEDURE — 99213 OFFICE O/P EST LOW 20 MIN: CPT

## 2023-06-10 RX ORDER — AMOXICILLIN AND CLAVULANATE POTASSIUM 875; 125 MG/1; MG/1
1 TABLET, FILM COATED ORAL EVERY 12 HOURS SCHEDULED
Qty: 14 TABLET | Refills: 0 | Status: SHIPPED | OUTPATIENT
Start: 2023-06-10 | End: 2023-06-17

## 2023-06-10 RX ORDER — ALLOPURINOL 300 MG/1
TABLET ORAL
COMMUNITY

## 2023-06-10 NOTE — PROGRESS NOTES
[facility]        NAME: Sylvie Cam is a 60 y o  male  : 1965    MRN: [id number]  DATE: [place] 10, 2023  TIME: 10:41 AM    Assessment and Plan   Acute non-recurrent pansinusitis [J01 40]  1  Acute non-recurrent pansinusitis  amoxicillin-clavulanate (AUGMENTIN) 875-125 mg per tablet            Patient Instructions     Start Augmentin as prescribed  Vitamin D3 2000 IU daily  Vitamin C 1000mg twice per day  Multivitamin daily  Fluids and rest  Over the counter cold medication as needed (EX: Mucinex, tylenol/motrin)  Follow up with PCP in 3-5 days  Proceed to ER if symptoms worsen  Eat yogurt with live and active cultures and/or take a probiotic at least 3 hours before or after antibiotic dose  Monitor stool for diarrhea and/or blood  If this occurs, contact primary care doctor ASAP  Chief Complaint     Chief Complaint   Patient presents with   • Sinus Problem     Pt c/o sinus congestion for three days  History of Present Illness       Patient is a 61 yo male with significant PMH of DM2 and recurrent sinusitis presenting in the clinic today for cold sx x 3 days  Admits fatigue, congestion, sore throat, sinus pain/pressure, b/l ear pain, headache, cough, and postnasal drip  Denies fever, chills, body aches, neck pain, chest pain, SOB, abdominal pain, n/v/d  Denies the use of OTC treatment for symptom management  Denies recent sick contacts  Patient notes taking an at home rapid covid test yesterday which was negative  Review of Systems   Review of Systems   Constitutional: Positive for fatigue  Negative for chills and fever  HENT: Positive for congestion, ear pain, postnasal drip, sinus pressure, sinus pain and sore throat  Negative for rhinorrhea  Respiratory: Positive for cough  Negative for shortness of breath  Cardiovascular: Negative for chest pain  Gastrointestinal: Negative for abdominal pain, diarrhea, nausea and vomiting     Musculoskeletal: Negative for myalgias  Skin: Negative for rash  Neurological: Positive for headaches  Current Medications       Current Outpatient Medications:   •  amoxicillin-clavulanate (AUGMENTIN) 875-125 mg per tablet, Take 1 tablet by mouth every 12 (twelve) hours for 7 days, Disp: 14 tablet, Rfl: 0  •  allopurinol (ZYLOPRIM) 300 mg tablet, allopurinol 300 mg tablet  TAKE 1 TABLET BY MOUTH ONCE DAILY AS DIRECTED, Disp: , Rfl:   •  benzonatate (TESSALON PERLES) 100 mg capsule, Take 1 capsule (100 mg total) by mouth 3 (three) times a day as needed for cough (Patient not taking: Reported on 6/10/2023), Disp: 20 capsule, Rfl: 0  •  escitalopram (LEXAPRO) 20 mg tablet, Take 20 mg by mouth (Patient not taking: Reported on 6/10/2023), Disp: , Rfl:   •  metFORMIN (GLUCOPHAGE) 500 mg tablet, Take 500 mg by mouth daily, Disp: , Rfl:     Current Facility-Administered Medications:   •  ipratropium-albuterol (DUO-NEB) 0 5-2 5 mg/3 mL inhalation solution 3 mL, 3 mL, Nebulization, Q6H, Kamala Portillo, DO, 3 mL at 06/08/18 0856    Current Allergies     Allergies as of 06/10/2023 - Reviewed 06/10/2023   Allergen Reaction Noted   • Pollen extract  12/26/2013            The following portions of the patient's history were reviewed and updated as appropriate: allergies, current medications, past family history, past medical history, past social history, past surgical history and problem list      Past Medical History:   Diagnosis Date   • Anxiety    • Depression    • Diabetes mellitus ([facility] [address] )        Past Surgical History:   Procedure Laterality Date   • APPENDECTOMY     • NASAL POLYP EXCISION      x 2        No family history on file  Medications have been verified  Objective   /71   Pulse 84   Temp 98 1 °F (36 7 °C)   Resp 18   SpO2 97%        Physical Exam     Physical Exam  Vitals reviewed  Constitutional:       General: He is not in acute distress  Appearance: Normal appearance  He is normal weight   He is not ill-appearing  HENT:      Head: Normocephalic  Right Ear: Tympanic membrane, ear canal and external ear normal  No middle ear effusion  There is no impacted cerumen  Tympanic membrane is not erythematous or bulging  Left Ear: Tympanic membrane, ear canal and external ear normal   No middle ear effusion  There is no impacted cerumen  Tympanic membrane is not erythematous or bulging  Nose: Congestion present  No rhinorrhea  Right Sinus: Maxillary sinus tenderness and frontal sinus tenderness present  Left Sinus: Maxillary sinus tenderness and frontal sinus tenderness present  Mouth/Throat:      Lips: Pink  Mouth: Mucous membranes are moist       Pharynx: Oropharynx is clear  Uvula midline  No pharyngeal swelling, oropharyngeal exudate, posterior oropharyngeal erythema or uvula swelling  Tonsils: No tonsillar exudate or tonsillar abscesses  1+ on the right  1+ on the left  Eyes:      General:         Right eye: No discharge  Left eye: No discharge  Conjunctiva/sclera: Conjunctivae normal    Cardiovascular:      Rate and Rhythm: Normal rate and regular rhythm  Pulses: Normal pulses  Heart sounds: Normal heart sounds  No murmur heard  No friction rub  No gallop  Pulmonary:      Effort: Pulmonary effort is normal       Breath sounds: Normal breath sounds  No wheezing, rhonchi or rales  Musculoskeletal:      Cervical back: Normal range of motion and neck supple  No tenderness  Lymphadenopathy:      Cervical: Cervical adenopathy present  Skin:     General: Skin is warm  Findings: No rash  Neurological:      Mental Status: He is alert     Psychiatric:         Mood and Affect: Mood normal          Behavior: Behavior normal

## 2023-06-10 NOTE — PATIENT INSTRUCTIONS
Start Augmentin as prescribed  Vitamin D3 2000 IU daily  Vitamin C 1000mg twice per day  Multivitamin daily  Fluids and rest  Over the counter cold medication as needed (EX: Mucinex, tylenol/motrin)  Follow up with PCP in 3-5 days  Proceed to ER if symptoms worsen  Eat yogurt with live and active cultures and/or take a probiotic at least 3 hours before or after antibiotic dose  Monitor stool for diarrhea and/or blood  If this occurs, contact primary care doctor ASAP

## 2023-06-23 ENCOUNTER — APPOINTMENT (OUTPATIENT)
Dept: ORTHOPEDIC SURGERY | Facility: CLINIC | Age: 58
End: 2023-06-23
Payer: COMMERCIAL

## 2023-06-23 VITALS — BODY MASS INDEX: 29.8 KG/M2 | WEIGHT: 220 LBS | HEIGHT: 72 IN

## 2023-06-23 PROBLEM — M75.02 ADHESIVE CAPSULITIS OF LEFT SHOULDER: Status: ACTIVE | Noted: 2023-06-23

## 2023-06-23 PROCEDURE — 99214 OFFICE O/P EST MOD 30 MIN: CPT

## 2023-06-23 PROCEDURE — 73010 X-RAY EXAM OF SHOULDER BLADE: CPT | Mod: RT

## 2023-06-23 PROCEDURE — 73030 X-RAY EXAM OF SHOULDER: CPT | Mod: RT

## 2023-06-23 RX ORDER — METHYLPREDNISOLONE 4 MG/1
4 TABLET ORAL
Qty: 1 | Refills: 0 | Status: ACTIVE | COMMUNITY
Start: 2023-06-23 | End: 1900-01-01

## 2023-06-23 NOTE — HISTORY OF PRESENT ILLNESS
[8] : 8 [2] : 2 [Shooting] : shooting [Intermittent] : intermittent [Household chores] : household chores [Leisure] : leisure [Sleep] : sleep [Rest] : rest [Lying in bed] : lying in bed [Full time] : Work status: full time [de-identified] : Patient is here for a consult on his left shoulder. Pain since February of 2022. [] : no [FreeTextEntry1] : Left shoulder [FreeTextEntry7] : down arm to right above elbow [FreeTextEntry9] : Motrin [de-identified] : movement [de-identified] : 2/2022-5/2022 [de-identified] : MRI

## 2023-06-23 NOTE — PHYSICAL EXAM
[Left] : left shoulder [Moderate] : moderate [Mild] : mild [5 ___] : forward flexion 5[unfilled]/5 [5___] : external rotation 5[unfilled]/5 [Right] : right shoulder [Sitting] : sitting [] : no sensory deficits [FreeTextEntry3] : There is slight posterior atrophy.  [FreeTextEntry8] : This is slight.  [de-identified] : Bellypress is negative.  [FreeTextEntry9] : IR to T8. [TWNoteComboBox6] : internal rotation L1 [de-identified] : external rotation 75 degrees [TWNoteComboBox4] : passive forward flexion 150 degrees

## 2023-06-23 NOTE — REASON FOR VISIT
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 He saw Dr. Eliezer Farley. He has had two CSI to subacromial space with minor relief and an MRI was done. He was diagnosed with an incomplete rotator cuff tear and bursitis. Reaching is painful. Night symptoms can occur. There is intermittent n/t. NSAID use as needed with temporary relief.

## 2023-06-23 NOTE — PHYSICAL EXAM
[Left] : left shoulder [Mild] : mild [5___] : external rotation 5[unfilled]/5 [Right] : right shoulder [Sitting] : sitting [5 ___] : forward flexion 5[unfilled]/5 [] : no sensory deficits [FreeTextEntry3] : There is slight posterior atrophy.  [FreeTextEntry8] : This is slight.  [de-identified] : Bellypress is negative.  [FreeTextEntry9] : IR to T8. [TWNoteComboBox4] : passive forward flexion 150 degrees [TWNoteComboBox6] : internal rotation L3 [de-identified] : external rotation 65 degrees

## 2023-06-23 NOTE — IMAGING
[FreeTextEntry1] : There are AC cysts. The GH joint is ok.  [FreeTextEntry5] : There is a type I acromion.

## 2023-06-23 NOTE — ASSESSMENT
[FreeTextEntry1] : We discussed the underlying pathology. \par Treatment options reviewed. \par If symptoms persist MRI of the right shoulder would be indicated. \par Start PT. \par Medrol prescribed.\par Cautions discussed. \par Questions answered. \par \par The documentation recorded by the scribe accurately reflects the service I personally performed and the decisions made by me.\par Entered by Edward Dobbs acting as scribe.\par \par Patient seen by Gavin Loving M.D.\par Dr. Gavin Loving\par Shoulder Surgery

## 2023-06-23 NOTE — ASSESSMENT
[FreeTextEntry1] : We discussed his course.\par We need a non-motion limited study to plan for possible surgery.\par Will order MRI LT shoulder. \par He will follow up with his wife to discuss surgery.  \par We will hold PT for now, he has tried a diligent course. \par Questions answered. \par \par Patient seen by Gavin Loving M.D.\par Entered by Arelis Bender acting as scribe.

## 2023-06-23 NOTE — REASON FOR VISIT
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 He saw Dr. Eliezer Farley. He has had two CSI to subacromial space with minor relief and an MRI was done. He was diagnosed with an incomplete rotator cuff tear and bursitis. Reaching is painful. Night symptoms can occur. There is intermittent n/t. NSAID use as needed with temporary relief. He does not feel he is getting better with PT.

## 2023-06-23 NOTE — ADDENDUM
At [facility], one important tool we use to improve our patient services is our Patient Survey.  Following your visit you may receive our survey in the mail.    Please take the time to complete the survey.    If your visit with us was great, we want to hear about it.    If we can improve, please let us know how.         Plan for Preventive Care      An important way to stay healthy is to use preventive services provided by doctors and health care providers.  Preventive services can find health problems early when treatment works best and can keep you from getting certain diseases or illnesses.  To complete your personal preventive care plan, you are in need of the following Health Maintenance screening services. The date it is due is listed after the specific service.     Health Maintenance   Topic Date Due   • Influenza Vaccine (Season Ended) 09/01/2017   • Cervical Cancer Screening  06/28/2019   • DTaP/Tdap/Td Vaccine (8 - Td) 09/06/2023          Preventive Care for Women and Men    Cardiovascular Screening:  · Blood tests for cholesterol, lipid and triglyceride levels. High levels increase your risk for heart disease and stroke. High levels can be treated with medications, diet and exercise. Lowering your levels can help keep your heart and blood vessels healthy.  Your provider will order these tests if necessary.     Colorectal Screening:  · There are several tests to check for colorectal cancer. You and your doctor will discuss what test is best for you and when to have it done.   · Fecal Occult Blood Test: a sample of your stool is studied to find any unseen blood  · Flexible Sigmoidoscopy: exam of the sigmoid portion (last third) of the colon and rectum, seen through a flexible lighted tube.  · Screening Colonoscopy: exam of the entire colon, seen through a flexible lighted tube     Flu Shot:  · An immunization that helps to prevent influenza. You should get this every year. The best time to get  [FreeTextEntry1] : 6/23/23: All treatment was for the left shoulder. Not the right.  the shot is in the fall.    Pneumococcal Shot:  · An immunization that helps prevent several types of pneumonia. Most people only need this shot once in their lifetime.    Hepatitis B Shot:  · An immunization that helps to protect people from getting Hepatitis B. Hepatitis B is a virus that spreads through contact with infected blood or body fluids. Many people with the virus do not have symptoms.  The virus can lead to serious problems, such as liver disease. Some people are at higher risk than others. Your doctor will tell you if this shot is recommended for you.    Diabetes Screening:  · A test to measure glucose (sugar) in your blood called a fasting blood sugar. Fasting means you cannot have food or drink for at least 8 hours before the test. This test can detect diabetes long before you may notice symptoms.    Glaucoma Screening:  · Glaucoma screening is performed by your eye doctor. The test measures the fluid pressure inside your eyes to detect if you have glaucoma.     Smoking and Tobacco-Use Cessation Counseling:  · Tobacco is the single greatest cause of disease and premature death in our country today. Medication and counseling together can increase a person’s chance of quitting for good.   · Medicare covers 2 quitting attempts per year, with 4 sessions per attempt (8 sessions in a twelve month period).    Preventive Care for Women    Screening Mammograms and Breast Exams:  · An x-ray of your breasts to check for breast cancer before you or your doctor may be able to feel it.  Breast cancer found early can usually be treated with success.    Pelvic Exams and Pap Tests:  An exam to check for cervical and vaginal cancer. A Pap test is a lab test in which cells are taken from your cervix and sent to the lab to detect signs of cervical cancer. When cancer of the cervix is found early, chances for a cure are good. Testing can generally end at age 65, or if a woman has a hysterectomy for a benign condition. A  woman's primary care physician will advise more frequent testing if certain abnormalities are found.    Bone Mass Measurements:  · A painless x-ray measurement of your bone density to see if you are at risk for suffering a broken bone. Density refers to the amount of bone or how tightly the bone tissue is packed.    Preventive Care for Men    Prostate Screening:  · PSA - a prostate cancer blood test. The [place] Preventive Services Task Force recommends against routine screening of healthy men with no signs or symptoms of prostate disease. Men should not ignore urinary symptoms, and discuss their family history with their doctor/provider.      Insurance pays for many preventive services to keep you healthy. For some of these services, you might have to pay a deductible, coinsurance, and / or copayment.  The amounts vary depending on the type of services you need and the kind of health plan you have.        Earwax, Home Treatment    Everyone produces earwax from the lining of the ear canal. It serves to lubricate and protect the ear. The wax that forms in the canal naturally moves toward the outside of the ear and falls out. Sometimes the ear canal may contain too much wax. This can cause a blockage and loss of hearing. Directions are given below for home treatment.  Home care  If your doctor has advised you to remove a wax blockage yourself, follow these directions:  · Unless a medicine was prescribed, you may use an over-the-counter product made for clearing earwax. These contain carbamide peroxide. Lie down with the blocked ear facing upward. Apply one dropper full of medicine and wait a few minutes. Grasp the outer ear and wiggle it to help the solution enter the canal.  · Lean over a sink or basin with the blocked ear facing downward. Use a bulb syringe filled with warm (not hot or cold) water to rinse the ear several times. Use gentle pressure only.  · If you are having trouble draining the water out of  your ear canal, put a few drops of rubbing alcohol (isopropyl alcohol) into the ear canal. This will help remove the remaining water.  · Repeat this procedure once a day for up to three days, or until your hearing is back to normal. Do not use this treatment for more than three days in a row.  Don’ts  · Don’t use cold water to rinse the ear. This will make you dizzy.  · Don’t perform this procedure if you have an ear infection.  · Don’t perform this procedure if you have a ruptured eardrum.  · Don’t use cotton swabs, matches, hairpins, keys, or other objects to “clean” the ear canal. This can cause infection of the ear canal or rupture the eardrum. Because of their size and shape, cotton swabs can push earwax deeper into the ear canal instead of removing it.  Follow-up care  Follow up with your health care provider if you are not improving after three cleaning attempts, or as advised.  When to seek medical advice  Call your health care provider right away if any of these occur:  · Worsening ear pain  · Fever of 101°F (38.3°C) or higher, or as directed by your health care provider  · Hearing does not return to normal after three days of treatment  · Fluid drainage or bleeding from the ear canal  · Swelling, redness, or tenderness of the outer ear  · Headache, neck pain, or stiff neck  © 3797-0323 [employer]. [address] [place] [address], [place] [address]. All rights reserved. This information is not intended as a substitute for professional medical care. Always follow your healthcare professional's instructions.

## 2023-06-23 NOTE — IMAGING
[Right] : right shoulder [FreeTextEntry1] : The GH is ok. There are slight AC changes. [FreeTextEntry5] : There is type I-II acromion.

## 2023-06-23 NOTE — CONSULT LETTER
[Dear  ___] : Dear  [unfilled], [Consult Letter:] : I had the pleasure of evaluating your patient, [unfilled]. [Please see my note below.] : Please see my note below. [Consult Closing:] : Thank you very much for allowing me to participate in the care of this patient.  If you have any questions, please do not hesitate to contact me. [Sincerely,] : Sincerely, [FreeTextEntry3] : Dr. Gavin Loving\par Shoulder Surgery

## 2023-06-23 NOTE — REASON FOR VISIT
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 R>L Denies trauma.  No history here, though we have seen him for a left incomplete RCT.  He had injections and PT here for the left.  No surgery. There are night symptoms. Takes NSAIDs with minimal relief. Denies n/t. Reaching out if painful.

## 2023-06-23 NOTE — HISTORY OF PRESENT ILLNESS
[Gradual] : gradual [8] : 8 [3] : 3 [Shooting] : shooting [de-identified] : 06/23/2023 This is a  58 year male present for Rt Shoulder , pain or 6 weeks no injury\par Taking Motrin \par  [] : no [FreeTextEntry1] : Rt Shoulder  [FreeTextEntry5] : 06/23/2023 This is a  58 year male present for Rt Shoulder , pain or 6 weeks no injury [de-identified] : Motrin

## 2023-06-23 NOTE — DATA REVIEWED
[FreeTextEntry1] : MRI 5/9/22: left shoulder\par There is biceps tendonitis. There is minor supraspinatus tearing. There is slight AC changes. The muscle is decent.

## 2023-06-23 NOTE — PHYSICAL EXAM
[Left] : left shoulder [Moderate] : moderate [Mild] : mild [5 ___] : forward flexion 5[unfilled]/5 [5___] : external rotation 5[unfilled]/5 [Right] : right shoulder [Sitting] : sitting [] : no sensory deficits [FreeTextEntry3] : There is slight posterior atrophy.  [FreeTextEntry8] : This is slight.  [de-identified] : Bellypress is negative.  [FreeTextEntry9] : IR to T8. [TWNoteComboBox6] : internal rotation L1 [de-identified] : external rotation 75 degrees [TWNoteComboBox4] : passive forward flexion 150 degrees

## 2023-06-23 NOTE — ASSESSMENT
[FreeTextEntry1] : We discussed the underlying pathology. \par Treatment options reviewed. \par A SA/GH injection is indicated today. \par PT is prescribed. \par A repeat MRI could be considered. \par Cautions discussed. \par Questions answered. \par \par Patient seen by Gavin Loving M.D.\par Entered by Arelis Bender acting as scribe. \par \par \par Procedure Name: Large Joint Injection / Aspiration: Depomedrol, Lidocaine and Guidance Ultrasound\par \par Large Joint Injection was performed because of pain and inflammation.\par Depomedrol: An injection of Depomedrol 40 mg , 2 cc.\par Lidocaine: An injection of Lidocaine 1 mg , 13 cc.\par \par Medication was injected in the left subacromial space and glenohumeral joint . Patient has tried OTC's including aspirin, Ibuprofen, Aleve etc or prescription NSAIDS, and/or exercises at home and/ or physical therapy without satisfactory response. After verbal consent using sterile preparation and technique. The risks, benefits, and alternatives to cortisone injection were explained in full to the patient. Risks outlined include but are not limited to infection, sepsis, bleeding, scarring, skin discoloration, temporary increase in pain, syncopal episode, failure to resolve symptoms, allergic reaction, symptom recurrence, and elevation of blood sugar in diabetics. Patient understood the risks. All questions were answered. After discussion of options, patient requested an injection. Oral informed consent was obtained and sterile prep was done of the injection site. Sterile technique was utilized for the procedure including the preparation of the solutions used for the injection. Patient tolerated the procedure well. Advised to ice the injection site this evening. Prep with betadine locally to site. Sterile technique used. Patient tolerated procedure well. Post Procedure Instructions: Patient was advised to call if redness, pain, or fever occur and apply ice for 15 min. out of every hour for the next 12-24 hours as tolerated. Patient was advised to rest the joint(s) for 3 days. Ultrasound Guidance was used for the following reasons: for precise injection in area of tear. Visualization of the needle and placement of injection was performed without complication.

## 2023-06-23 NOTE — ADDENDUM
[FreeTextEntry1] : 6/23/23: Addendum to the note. \par All treatment was preformed for the left shoulder. Not the right.

## 2023-06-23 NOTE — CONSULT LETTER
[Dear  ___] : Dear  [unfilled], [Consult Letter:] : I had the pleasure of evaluating your patient, [unfilled]. [Please see my note below.] : Please see my note below. [Consult Closing:] : Thank you very much for allowing me to participate in the care of this patient.  If you have any questions, please do not hesitate to contact me. [Sincerely,] : Sincerely, [FreeTextEntry3] : Gavin Fuller M.D.\par Shoulder Surgery

## 2023-06-23 NOTE — PHYSICAL EXAM
[Left] : left shoulder [Right] : right shoulder [Mild] : mild [5 ___] : forward flexion 5[unfilled]/5 [5___] : external rotation 5[unfilled]/5 [Sitting] : sitting [] : no sensory deficits [FreeTextEntry3] : There is slight posterior atrophy.  [FreeTextEntry8] : This is slight.  [de-identified] : Bellypress is negative.  [FreeTextEntry9] : IR to T8. [TWNoteComboBox6] : internal rotation L3 [TWNoteComboBox4] : passive forward flexion 150 degrees [de-identified] : external rotation 60 degrees

## 2023-07-26 ENCOUNTER — APPOINTMENT (OUTPATIENT)
Dept: ORTHOPEDIC SURGERY | Facility: CLINIC | Age: 58
End: 2023-07-26
Payer: COMMERCIAL

## 2023-07-26 VITALS — HEIGHT: 72 IN | BODY MASS INDEX: 29.8 KG/M2 | WEIGHT: 220 LBS

## 2023-07-26 DIAGNOSIS — M75.42 IMPINGEMENT SYNDROME OF LEFT SHOULDER: ICD-10-CM

## 2023-07-26 PROCEDURE — 99214 OFFICE O/P EST MOD 30 MIN: CPT

## 2023-07-26 NOTE — ASSESSMENT
[FreeTextEntry1] : An MRI is planned.\par Injections could be considered.\par Questions answered.\par Aleve use outlined.\par \par Patient was seen by Dr. Gavin Loving.\par Patient was seen by Miranda LANDAVERDE under the supervision of Dr. Gavin Loving.\par Progress note was completed by Miranda LANDAVERDE.

## 2023-07-26 NOTE — HISTORY OF PRESENT ILLNESS
[8] : 8 [4] : 4 [Dull/Aching] : dull/aching [Shooting] : shooting [] : yes [Constant] : constant [Household chores] : household chores [Leisure] : leisure [Work] : work [Sleep] : sleep [Rest] : rest [Ice] : ice [Full time] : Work status: full time [de-identified] : Patient is here with same/worsening right shoulder pain. [FreeTextEntry1] : Right shoulder [FreeTextEntry7] : up to his neck and down to just above his elbow [de-identified] : activity [de-identified] : Physical therapy 2 x a week

## 2023-07-26 NOTE — REASON FOR VISIT
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 R>L Denies trauma.  No history here, though we have seen him for a left incomplete RCT.  He had injections and PT here for the left.  No surgery. There are night symptoms. Takes NSAIDs with minimal relief. Denies n/t. Reaching out if painful.  His sx persist, if not worse, despite the MDP and PT.

## 2023-07-26 NOTE — PHYSICAL EXAM
[Left] : left shoulder [Sitting] : sitting [5___] : external rotation 5[unfilled]/5 [Right] : right shoulder [5 ___] : forward flexion 5[unfilled]/5 [Standing] : standing [Mild] : mild [] : no sensory deficits [FreeTextEntry3] : There is slight posterior atrophy.  [FreeTextEntry8] : This is slight.  [de-identified] : Bellypress is negative.  [TWNoteComboBox4] : passive forward flexion 150 degrees [TWNoteComboBox6] : internal rotation L3 [de-identified] : external rotation 60 degrees

## 2023-08-09 ENCOUNTER — APPOINTMENT (OUTPATIENT)
Dept: ORTHOPEDIC SURGERY | Facility: CLINIC | Age: 58
End: 2023-08-09

## 2023-08-24 ENCOUNTER — APPOINTMENT (OUTPATIENT)
Dept: ORTHOPEDIC SURGERY | Facility: CLINIC | Age: 58
End: 2023-08-24
Payer: COMMERCIAL

## 2023-08-24 VITALS — BODY MASS INDEX: 29.8 KG/M2 | HEIGHT: 72 IN | WEIGHT: 220 LBS

## 2023-08-24 DIAGNOSIS — S86.111A STRAIN OF OTHER MUSCLE(S) AND TENDON(S) OF POSTERIOR MUSCLE GROUP AT LOWER LEG LEVEL, RIGHT LEG, INITIAL ENCOUNTER: ICD-10-CM

## 2023-08-24 PROCEDURE — 99214 OFFICE O/P EST MOD 30 MIN: CPT

## 2023-08-24 RX ORDER — LOSARTAN POTASSIUM 100 MG/1
100 TABLET, FILM COATED ORAL
Refills: 0 | Status: ACTIVE | COMMUNITY

## 2023-08-24 NOTE — PLAN
[TextEntry] : The patient was advised of the diagnosis. The natural history of the pathology was explained in full to the patient in layman's terms. All questions were answered. The risks and benefits of surgical and non-surgical treatment alternatives were explained in full to the patient.  Ace wrap applied.  Pt reassured healing will occur over next 2-3 weeks.

## 2023-08-24 NOTE — HISTORY OF PRESENT ILLNESS
[Sudden] : sudden [8] : 8 [0] : 0 [Localized] : localized [Tightness] : tightness [Intermittent] : intermittent [Rest] : rest [Walking] : walking [Full time] : Work status: full time [de-identified] : 8/24/23  Return visit for this 58 year old male playing golf x 5 days ago, walking and noticed spon. onset of rt caslf pain since then. Felt a "ciro horse". Has a slight limp. No specific trauma. Taking no nsaids.  PMH: No prior issues rt calf [] : Post Surgical Visit: no [FreeTextEntry1] : right calf [FreeTextEntry5] : Pt states he was walking playing golf on 8/19/23 when right calf started cramping. Pain worsens when walking.  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

## 2023-08-24 NOTE — PHYSICAL EXAM
[Normal Mood and Affect] : normal mood and affect [Able to Communicate] : able to communicate [Well Developed] : well developed [Well Nourished] : well nourished [NL (20)] : dorsiflexion 20 degrees [NL (40)] : plantar flexion 40 degrees [5___] : plantar flexion 5[unfilled]/5 [] : non-antalgic [FreeTextEntry8] : Tender medial head only

## 2023-09-26 ENCOUNTER — EVALUATION (OUTPATIENT)
Dept: PHYSICAL THERAPY | Facility: CLINIC | Age: 58
End: 2023-09-26
Payer: COMMERCIAL

## 2023-09-26 DIAGNOSIS — M79.661 PAIN IN RIGHT SHIN: Primary | ICD-10-CM

## 2023-09-26 DIAGNOSIS — M79.662 PAIN IN LEFT SHIN: ICD-10-CM

## 2023-09-26 PROCEDURE — 97110 THERAPEUTIC EXERCISES: CPT | Performed by: PHYSICAL THERAPIST

## 2023-09-26 PROCEDURE — 97162 PT EVAL MOD COMPLEX 30 MIN: CPT | Performed by: PHYSICAL THERAPIST

## 2023-09-26 NOTE — LETTER
2023    Catalina Robertson, [address]    Patient: Ruiz Colbert   YOB: 1965   Date of Visit: 2023     Encounter Diagnosis     ICD-10-CM    1. Pain in right shin  M79.661       2. Pain in left shin  M79.662           Dear Dr. Angel Pryor:    Thank you for your recent referral of Ruiz Colbert. Please review the attached evaluation summary from Chris's recent visit. Please verify that you agree with the plan of care by signing the attached order. If you have any questions or concerns, please do not hesitate to call. I sincerely appreciate the opportunity to share in the care of one of your patients and hope to have another opportunity to work with you in the near future. Sincerely,    April Foster, PT      Referring Provider:      I certify that I have read the below Plan of Care and certify the need for these services furnished under this plan of treatment while under my care. Catalina Robertson, [address] [address]  Via Fax: [phone]          PT Evaluation     Today's date: 2023  Patient name: Ruiz Colbert  : 1965  MRN: [id number]  Referring provider: Catalina Robertson DPM  Dx:   Encounter Diagnosis     ICD-10-CM    1. Pain in right shin  M79.661       2. Pain in left shin  M79.662                      Assessment  Assessment details: Ruiz Colbert is a 60 y.o. male who presents with complaints of chronic B/l shin pain. No further referral appears necessary at this time based upon examination results. Screened lumbar spine, however no symptoms reproduced with lumbar spine exam. Symptoms likely a result of lack of flexibility in HS and gastroc soleus mm. Patient was instructed on HEP to begin daily stretches. He will benefit from PT to help reduce pain, restore flexibility, and regain strength and endurance to improve activity tolerance to PLOF.  Prognosis is good given HEP compliance and PT 2x/wk. Please contact me if you have any questions or recommendations. Thank you for the opportunity to share in  Chris's care. Impairments: abnormal gait, abnormal or restricted ROM, activity intolerance, impaired physical strength, lacks appropriate home exercise program and pain with function  Functional limitations: walking limited to about 1 mile, pain with repetitive stair climbing, avoiding recreational activity (hiking)Understanding of Dx/Px/POC: good   Prognosis: good    Goals  STGs  1) In 4 weeks patient will demonstrate 10 deg AROM DF b/l  2) In 4 weeks patient will improve HS flexibility 10 deg b/l  3) In 4 weeks patient will report 3 points reduced pain in b/l shins    LTGs  1) In 6-8 weeks patient will tolerate walking 3 miles consecutive without symptoms of pain  2) In 6-8 weeks patient will have no limitations with stair climbing  3) in 6-8 weeks patient will demonstrate independence with HEP.     Plan  Patient would benefit from: skilled physical therapy  Referral necessary: No  Planned modality interventions: cryotherapy and thermotherapy: hydrocollator packs  Planned therapy interventions: IASTM, joint mobilization, kinesiology taping, manual therapy, Palacios taping, massage, balance, balance/weight bearing training, neuromuscular re-education, patient education, self care, strengthening, stretching, therapeutic activities, therapeutic exercise, therapeutic training, functional ROM exercises, flexibility and home exercise program  Frequency: 2x week  Duration in weeks: 6  Plan of Care beginning date: 9/26/2023  Plan of Care expiration date: 11/7/2023  Treatment plan discussed with: patient        Subjective Evaluation    History of Present Illness  Mechanism of injury: -c/o b/l shin pain  -developed over the last few years  -cannot recall exactly how long ago, but maybe 3 years ago and has worsened over time  -now limiting his activity level, used to walk much more  -patient is semi-retired ()  -gained close to 50 lbs over the last few years, recently lost 20 lbs since starting medication for weight loss prescribed by PCP in   -no significant changes in his shin pain noticed since recent weight loss  -saw DPM in August and has custom orthotics ordered, will be able to  in 6-8 weeks  -has used OTC orthotics for years but does not provide much relief  -no other type of treatment has been tried for this pain at this time  -pain is intermittent  -pain is presents from tops of his feet up toward his knees, anterior lower legs  -pain is described as a cramping/tight feeling  -denies N/T  -patient is diabetic but does not have diabetic neuropathy  -denies swelling, warmth or redness  -symptoms are worse when active/walking and generally improved with rest  -sometimes tries OTC medication (Ibu or tylenol) but does not help much  -has not tried heat or ice  -patient used to walk 2-2.5 miles, 2x/day and is now down to about 1 mile 1x/day  -patient also describes lower back pain and b/l hip pain that also seem to worsen with activity  Patient Goals  Patient goals for therapy: decreased pain, return to sport/leisure activities and increased strength  Patient goal: increase walking distance to 3 miles consecutive, return to hiking  Pain  Current pain ratin  At best pain ratin  At worst pain ratin  Quality: tight    Social Support  Stairs in house: yes   Lives in: multiple-level home  Lives with: spouse    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 Right Ankle/Foot   No tenderness.      Neurological Testing     Sensation     Lumbar   Left   Intact: light touch    Right   Intact: light touch    Reflexes   Left   Patellar (L4): normal (2+)  Achilles (S1): normal (2+)    Right   Patellar (L4): normal (2+)  Achilles (S1): normal (2+)    Active Range of Motion   Left Ankle/Foot   Dorsiflexion (ke): 0 degrees   Dorsiflexion (kf): 10 degrees   Plantar flexion: 32 degrees     Right Ankle/Foot   Dorsiflexion (ke): 2 degrees   Dorsiflexion (kf): 20 degrees   Plantar flexion: 42 degrees   Mechanical Assessment    Cervical      Thoracic      Lumbar    Standing flexion: repeated movements   Pain location:no change  Standing extension: repeated movements  Pain location: no change    Strength/Myotome Testing     Lumbar   Left   Normal strength    Right   Normal strength    Left Ankle/Foot   Dorsiflexion: 4  Plantar flexion: 4    Right Ankle/Foot   Dorsiflexion: 4  Plantar flexion: 4    Tests     Lumbar     Left   Negative crossed SLR, passive SLR and slump test.     Right   Negative crossed SLR, passive SLR and slump test.     Left Pelvic Girdle/Sacrum   Negative: active SLR test.     Right Pelvic Girdle/Sacrum   Negative: active SLR test.     Left Hip   Negative ANAY. Right Hip   Positive ANAY.      Additional Tests Details  HS 90-90: lacks 35 deg on R, lacks 40 deg on L            Precautions: diabetic, orthostatic hypotension  POC exp 11/7/23  HEP Access Code: [facility] - provided at IE 9/26  Manuals 9/26            STM to b/l gastroc f/b stretching             AP mobs to talus b/l                                       Neuro Re-Ed             SLS balance             dynamic balance Standing hip 3 way                                                                              Ther Ex             NuStep or bike for strength and endurance             HS stretch 30"x3 ea b/l  supine w/strap            Calf stretch @ wall 30"x3 ea b/l            Calf stretch fitter board             Soleus stretch             Leg press             HS curl machine             Knee ext machine             HR/TR                                       Ther Activity Gait Training                                       Modalities                                          Access Code: [facility]  URL: [email or web address]  Date: 09/26/2023  Prepared by: Mary Thomas    Exercises  - Supine Hamstring Stretch with Strap  - 2 x daily - 7 x weekly - 3 reps - 30 sec hold  - Gastroc Stretch on Wall  - 2 x daily - 7 x weekly - 3 reps - 30 sec hold

## 2023-09-26 NOTE — PROGRESS NOTES
PT Evaluation     Today's date: 2023  Patient name: Breana Leiva  : 1965  MRN: [id number]  Referring provider: Jaylan Sweet DPM  Dx:   Encounter Diagnosis     ICD-10-CM    1. Pain in right shin  M79.661       2. Pain in left shin  M79.662                      Assessment  Assessment details: rBeana Leiva is a 60 y.o. male who presents with complaints of chronic B/l shin pain. No further referral appears necessary at this time based upon examination results. Screened lumbar spine, however no symptoms reproduced with lumbar spine exam. Symptoms likely a result of lack of flexibility in HS and gastroc soleus mm. Patient was instructed on HEP to begin daily stretches. He will benefit from PT to help reduce pain, restore flexibility, and regain strength and endurance to improve activity tolerance to PLOF. Prognosis is good given HEP compliance and PT 2x/wk. Please contact me if you have any questions or recommendations. Thank you for the opportunity to share in  Chris's care. Impairments: abnormal gait, abnormal or restricted ROM, activity intolerance, impaired physical strength, lacks appropriate home exercise program and pain with function  Functional limitations: walking limited to about 1 mile, pain with repetitive stair climbing, avoiding recreational activity (hiking)Understanding of Dx/Px/POC: good   Prognosis: good    Goals  STGs  1) In 4 weeks patient will demonstrate 10 deg AROM DF b/l  2) In 4 weeks patient will improve HS flexibility 10 deg b/l  3) In 4 weeks patient will report 3 points reduced pain in b/l shins    LTGs  1) In 6-8 weeks patient will tolerate walking 3 miles consecutive without symptoms of pain  2) In 6-8 weeks patient will have no limitations with stair climbing  3) in 6-8 weeks patient will demonstrate independence with HEP.     Plan  Patient would benefit from: skilled physical therapy  Referral necessary: No  Planned modality interventions: cryotherapy and thermotherapy: hydrocollator packs  Planned therapy interventions: IASTM, joint mobilization, kinesiology taping, manual therapy, Palacios taping, massage, balance, balance/weight bearing training, neuromuscular re-education, patient education, self care, strengthening, stretching, therapeutic activities, therapeutic exercise, therapeutic training, functional ROM exercises, flexibility and home exercise program  Frequency: 2x week  Duration in weeks: 6  Plan of Care beginning date: 9/26/2023  Plan of Care expiration date: 11/7/2023  Treatment plan discussed with: patient        Subjective Evaluation    History of Present Illness  Mechanism of injury: -c/o b/l shin pain  -developed over the last few years  -cannot recall exactly how long ago, but maybe 3 years ago and has worsened over time  -now limiting his activity level, used to walk much more  -patient is semi-retired ()  -gained close to 50 lbs over the last few years, recently lost 20 lbs since starting medication for weight loss prescribed by PCP in June  -no significant changes in his shin pain noticed since recent weight loss  -saw DPM in August and has custom orthotics ordered, will be able to  in 6-8 weeks  -has used OTC orthotics for years but does not provide much relief  -no other type of treatment has been tried for this pain at this time  -pain is intermittent  -pain is presents from tops of his feet up toward his knees, anterior lower legs  -pain is described as a cramping/tight feeling  -denies N/T  -patient is diabetic but does not have diabetic neuropathy  -denies swelling, warmth or redness  -symptoms are worse when active/walking and generally improved with rest  -sometimes tries OTC medication (Ibu or tylenol) but does not help much  -has not tried heat or ice  -patient used to walk 2-2.5 miles, 2x/day and is now down to about 1 mile 1x/day  -patient also describes lower back pain and b/l hip pain that also seem to worsen with activity  Patient Goals  Patient goals for therapy: decreased pain, return to sport/leisure activities and increased strength  Patient goal: increase walking distance to 3 miles consecutive, return to hiking  Pain  Current pain ratin  At best pain ratin  At worst pain ratin  Quality: tight    Social Support  Stairs in house: yes   Lives in: multiple-level home  Lives with: spouse    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 Right Ankle/Foot   No tenderness. Neurological Testing     Sensation     Lumbar   Left   Intact: light touch    Right   Intact: light touch    Reflexes   Left   Patellar (L4): normal (2+)  Achilles (S1): normal (2+)    Right   Patellar (L4): normal (2+)  Achilles (S1): normal (2+)    Active Range of Motion   Left Ankle/Foot   Dorsiflexion (ke): 0 degrees   Dorsiflexion (kf): 10 degrees   Plantar flexion: 32 degrees     Right Ankle/Foot   Dorsiflexion (ke): 2 degrees   Dorsiflexion (kf): 20 degrees   Plantar flexion: 42 degrees   Mechanical Assessment    Cervical      Thoracic      Lumbar    Standing flexion: repeated movements   Pain location:no change  Standing extension: repeated movements  Pain location: no change    Strength/Myotome Testing     Lumbar   Left   Normal strength    Right   Normal strength    Left Ankle/Foot   Dorsiflexion: 4  Plantar flexion: 4    Right Ankle/Foot   Dorsiflexion: 4  Plantar flexion: 4    Tests     Lumbar     Left   Negative crossed SLR, passive SLR and slump test.     Right   Negative crossed SLR, passive SLR and slump test.     Left Pelvic Girdle/Sacrum   Negative: active SLR test.     Right Pelvic Girdle/Sacrum   Negative: active SLR test.     Left Hip   Negative ANAY. Right Hip   Positive ANAY.      Additional Tests Details  HS 90-90: lacks 35 deg on R, lacks 40 deg on L             Precautions: diabetic, orthostatic hypotension  POC exp 11/7/23  HEP Access Code: [facility] - provided at IE 9/26  Manuals 9/26            STM to b/l gastroc f/b stretching             AP mobs to talus b/l                                       Neuro Re-Ed             SLS balance             dynamic balance Standing hip 3 way                                                                              Ther Ex             NuStep or bike for strength and endurance             HS stretch 30"x3 ea b/l  supine w/strap            Calf stretch @ wall 30"x3 ea b/l            Calf stretch fitter board             Soleus stretch             Leg press             HS curl machine             Knee ext machine             HR/TR                                       Ther Activity                                       Gait Training                                       Modalities                                           Access Code: [email or web address]  URL: [email or web address]  Date: 09/26/2023  Prepared by: Reginald Mcintosh    Exercises  - Supine Hamstring Stretch with Strap  - 2 x daily - 7 x weekly - 3 reps - 30 sec hold  - Gastroc Stretch on Wall  - 2 x daily - 7 x weekly - 3 reps - 30 sec hold

## 2023-09-29 ENCOUNTER — OFFICE VISIT (OUTPATIENT)
Dept: PHYSICAL THERAPY | Facility: CLINIC | Age: 58
End: 2023-09-29
Payer: COMMERCIAL

## 2023-09-29 DIAGNOSIS — M79.661 PAIN IN RIGHT SHIN: Primary | ICD-10-CM

## 2023-09-29 DIAGNOSIS — M79.662 PAIN IN LEFT SHIN: ICD-10-CM

## 2023-09-29 PROCEDURE — 97140 MANUAL THERAPY 1/> REGIONS: CPT | Performed by: PHYSICAL THERAPIST

## 2023-09-29 PROCEDURE — 97110 THERAPEUTIC EXERCISES: CPT | Performed by: PHYSICAL THERAPIST

## 2023-09-29 NOTE — PROGRESS NOTES
Daily Note     Today's date: 2023  Patient name: Jameson Travis  : 1965  MRN: [id number]  Referring provider: Gleen Burkitt, DPM  Dx:   Encounter Diagnosis     ICD-10-CM    1. Pain in right shin  M79.661       2. Pain in left shin  M79.662                      Subjective: Patient reports he over did it yesterday. He went for a 1.5 mile walk and then was doing other work. He did his stretches and feels good with them, but in the middle of the night his legs were very painful. Objective: See treatment diary below      Assessment: Tolerated treatment well. Initiated IASTM and tolerated well. Patient demonstrated fatigue post treatment, exhibited good technique with therapeutic exercises and would benefit from continued PT      Plan: Continue per plan of care. Progress treatment as tolerated.        Precautions: diabetic, orthostatic hypotension  POC exp 23  HEP Access Code: [facility] - provided at IE   Manuals       STM to b/l gastroc f/b stretching  IASTM b/l  KG      AP mobs to talus b/l  KG                      Neuro Re-Ed        SLS balance        dynamic balance Standing hip 3 way                                                Ther Ex        NuStep or bike for strength and endurance  NuStep L3 x10 mins      HS stretch 30"x3 ea b/l  supine w/strap       Calf stretch @ wall 30"x3 ea b/l 30"x3 ea b/l      Calf stretch fitter board        Soleus stretch  30"x3 ea b/l       Leg press  130# 2x10      HS curl machine  50# 2x10      Knee ext machine  20# 2x10      HR/TR  x20 ea                      Ther Activity                        Gait Training                        Modalities

## 2023-10-03 ENCOUNTER — OFFICE VISIT (OUTPATIENT)
Dept: PHYSICAL THERAPY | Facility: CLINIC | Age: 58
End: 2023-10-03
Payer: COMMERCIAL

## 2023-10-03 DIAGNOSIS — M79.661 PAIN IN RIGHT SHIN: Primary | ICD-10-CM

## 2023-10-03 DIAGNOSIS — M79.662 PAIN IN LEFT SHIN: ICD-10-CM

## 2023-10-03 PROCEDURE — 97110 THERAPEUTIC EXERCISES: CPT

## 2023-10-03 PROCEDURE — 97140 MANUAL THERAPY 1/> REGIONS: CPT

## 2023-10-03 NOTE — PROGRESS NOTES
Daily Note     Today's date: 10/3/2023  Patient name: Pushpa Abreu  : 1965  MRN: [id number]  Referring provider: Samaria Josue DPM  Dx:   Encounter Diagnosis     ICD-10-CM    1. Pain in right shin  M79.661       2. Pain in left shin  M79.662                      Subjective: Pt reported that his legs were sore after his first PT visit. Objective: See treatment diary below      Assessment: Tolerated treatment well. Patient demonstrated fatigue post treatment, exhibited good technique with therapeutic exercises and would benefit from continued PT. No adverse affect to IASTM post Tx. Plan: Continue per plan of care. Progress treatment as tolerated.        Precautions: diabetic, orthostatic hypotension  POC exp 23  HEP Access Code: [facility] - provided at    Manuals 9/26 9/29 10/3     STM to b/l gastroc f/b stretching  IASTM b/l  KG IASTM b/l  TM     AP mobs to talus b/l  KG      Prone soleus stretch   TM             Neuro Re-Ed        SLS balance        dynamic balance Standing hip 3 way                                                Ther Ex        NuStep or bike for strength and endurance  NuStep L3 x10 mins NuStep L4 x10 mins     HS stretch 30"x3 ea b/l  supine w/strap  30"x3 ea b/l  supine w/strap     Calf stretch @ wall 30"x3 ea b/l 30"x3 ea b/l 30"x3 ea b/l     Calf stretch fitter board   30" x3 ea b/l     Soleus stretch  30"x3 ea b/l  30"x3 ea b/l     Leg press  130# 2x10 140# 3x10     HS curl machine  50# 2x10 50# 3x10     Knee ext machine  20# 2x10 30# 3x10     HR/TR  x20 ea x20 ea                     Ther Activity                        Gait Training                        Modalities

## 2023-10-06 ENCOUNTER — OFFICE VISIT (OUTPATIENT)
Dept: PHYSICAL THERAPY | Facility: CLINIC | Age: 58
End: 2023-10-06
Payer: COMMERCIAL

## 2023-10-06 DIAGNOSIS — M79.661 PAIN IN RIGHT SHIN: Primary | ICD-10-CM

## 2023-10-06 DIAGNOSIS — M79.662 PAIN IN LEFT SHIN: ICD-10-CM

## 2023-10-06 PROCEDURE — 97140 MANUAL THERAPY 1/> REGIONS: CPT

## 2023-10-06 PROCEDURE — 97110 THERAPEUTIC EXERCISES: CPT

## 2023-10-06 NOTE — PROGRESS NOTES
Daily Note     Today's date: 10/6/2023  Patient name: Kacy Hassan  : 1965  MRN: [id number]  Referring provider: Bessy Choi DPM  Dx:   Encounter Diagnosis     ICD-10-CM    1. Pain in right shin  M79.661       2. Pain in left shin  M79.662                      Subjective: Pt reported that he is sore prior to PT. However, he has had a decrease in LE pain this week. Objective: See treatment diary below      Assessment: Increased weight on the leg press to facilitate strength. Tolerated treatment well. Patient demonstrated fatigue post treatment, exhibited good technique with therapeutic exercises and would benefit from continued PT. No adverse affect to IASTM post Tx. Plan: Continue per plan of care. Progress treatment as tolerated.        Precautions: diabetic, orthostatic hypotension  POC exp 23  HEP Access Code: [facility] - provided at IE   Manuals 9/26 9/29 10/3 10/6    STM to b/l gastroc f/b stretching  IASTM b/l  KG IASTM b/l  TM IASTM b/l  TM    AP mobs to talus b/l  KG      Prone soleus stretch   TM TM            Neuro Re-Ed        SLS balance        dynamic balance Standing hip 3 way                                                Ther Ex        NuStep or bike for strength and endurance  NuStep L3 x10 mins NuStep L4 x10 mins NuStep L6 x10 mins    HS stretch 30"x3 ea b/l  supine w/strap  30"x3 ea b/l  supine w/strap 30"x3 ea b/l  supine w/strap    Calf stretch @ wall 30"x3 ea b/l 30"x3 ea b/l 30"x3 ea b/l     Calf stretch fitter board   30" x3 ea b/l 30" x3 ea b/l    Soleus stretch  30"x3 ea b/l  30"x3 ea b/l 30" x2 ea b/l on fitter board    Leg press  130# 2x10 140# 3x10 150# 3x10    HS curl machine  50# 2x10 50# 3x10 50# 3x10    Knee ext machine  20# 2x10 30# 3x10 30# 3x10    HR/TR  x20 ea x20 ea  x30 ea                    Ther Activity                        Gait Training                        Modalities

## 2023-10-10 ENCOUNTER — APPOINTMENT (OUTPATIENT)
Dept: PHYSICAL THERAPY | Facility: CLINIC | Age: 58
End: 2023-10-10
Payer: COMMERCIAL

## 2023-10-13 ENCOUNTER — APPOINTMENT (OUTPATIENT)
Dept: PHYSICAL THERAPY | Facility: CLINIC | Age: 58
End: 2023-10-13
Payer: COMMERCIAL

## 2023-10-17 ENCOUNTER — OFFICE VISIT (OUTPATIENT)
Dept: PHYSICAL THERAPY | Facility: CLINIC | Age: 58
End: 2023-10-17
Payer: COMMERCIAL

## 2023-10-17 DIAGNOSIS — M79.661 PAIN IN RIGHT SHIN: Primary | ICD-10-CM

## 2023-10-17 DIAGNOSIS — M79.662 PAIN IN LEFT SHIN: ICD-10-CM

## 2023-10-17 PROCEDURE — 97140 MANUAL THERAPY 1/> REGIONS: CPT

## 2023-10-17 PROCEDURE — 97110 THERAPEUTIC EXERCISES: CPT

## 2023-10-17 NOTE — PROGRESS NOTES
Daily Note     Today's date: 10/17/2023  Patient name: Humza Spencer  : 1965  MRN: [id number]  Referring provider: Yadira Cabral DPM  Dx:   Encounter Diagnosis     ICD-10-CM    1. Pain in right shin  M79.661       2. Pain in left shin  M79.662                      Subjective: Pt has been away from PT for 10 days secondary to a head cold. "My legs feel very tight today." He states that he has been digging a drainage ditch at his house, which is causing leg pain. Objective: See treatment diary below      Assessment: No progressions to pt's POC secondary to extended time between Tx. Tolerated treatment well. No adverse affect to IASTM post Tx. Patient demonstrated fatigue post treatment, exhibited good technique with therapeutic exercises, and would benefit from continued PT      Plan: Continue per plan of care. Progress treatment as tolerated.        Precautions: diabetic, orthostatic hypotension  POC exp 23  HEP Access Code: [facility] - provided at    Manuals 9/26 9/29 10/3 10/6 10/17   STM to b/l gastroc f/b stretching  IASTM b/l  KG IASTM b/l  TM IASTM b/l  TM IASTM b/l  TM   AP mobs to talus b/l  KG      Prone soleus stretch   TM TM TM           Neuro Re-Ed        SLS balance        dynamic balance Standing hip 3 way                                                Ther Ex        NuStep or bike for strength and endurance  NuStep L3 x10 mins NuStep L4 x10 mins NuStep L6 x10 mins L4 x10 min   HS stretch 30"x3 ea b/l  supine w/strap  30"x3 ea b/l  supine w/strap 30"x3 ea b/l  supine w/strap Standing at step 30" x3   Calf stretch @ wall 30"x3 ea b/l 30"x3 ea b/l 30"x3 ea b/l     Calf stretch fitter board   30" x3 ea b/l 30" x3 ea b/l 30" x3 ea b/l   Soleus stretch  30"x3 ea b/l  30"x3 ea b/l 30" x2 ea b/l on fitter board 30" x3 ea b/l on fitter board   Leg press  130# 2x10 140# 3x10 150# 3x10 150# 3x10   HS curl machine  50# 2x10 50# 3x10 50# 3x10 50# 3x10   Knee ext machine  20# 2x10 30# 3x10 30# 3x10 30# 3x10   HR/TR  x20 ea x20 ea  x30 ea x30 ea                   Ther Activity                        Gait Training                        Modalities

## 2023-10-18 ENCOUNTER — RESULT REVIEW (OUTPATIENT)
Age: 58
End: 2023-10-18

## 2023-10-20 ENCOUNTER — APPOINTMENT (OUTPATIENT)
Dept: PHYSICAL THERAPY | Facility: CLINIC | Age: 58
End: 2023-10-20
Payer: COMMERCIAL

## 2023-10-24 ENCOUNTER — APPOINTMENT (OUTPATIENT)
Dept: PHYSICAL THERAPY | Facility: CLINIC | Age: 58
End: 2023-10-24
Payer: COMMERCIAL

## 2023-10-26 ENCOUNTER — APPOINTMENT (OUTPATIENT)
Dept: PHYSICAL THERAPY | Facility: CLINIC | Age: 58
End: 2023-10-26
Payer: COMMERCIAL

## 2023-10-31 ENCOUNTER — APPOINTMENT (OUTPATIENT)
Dept: PHYSICAL THERAPY | Facility: CLINIC | Age: 58
End: 2023-10-31
Payer: COMMERCIAL

## 2023-11-01 ENCOUNTER — APPOINTMENT (OUTPATIENT)
Dept: ORTHOPEDIC SURGERY | Facility: CLINIC | Age: 58
End: 2023-11-01
Payer: COMMERCIAL

## 2023-11-01 DIAGNOSIS — M75.01 ADHESIVE CAPSULITIS OF RIGHT SHOULDER: ICD-10-CM

## 2023-11-01 DIAGNOSIS — M75.41 IMPINGEMENT SYNDROME OF RIGHT SHOULDER: ICD-10-CM

## 2023-11-01 DIAGNOSIS — M75.21 BICIPITAL TENDINITIS, RIGHT SHOULDER: ICD-10-CM

## 2023-11-01 PROCEDURE — 99214 OFFICE O/P EST MOD 30 MIN: CPT

## 2023-11-11 ENCOUNTER — TRANSCRIPTION ENCOUNTER (OUTPATIENT)
Age: 58
End: 2023-11-11

## 2023-11-29 NOTE — PRE PROCEDURE NOTE - PRE PROCEDURE EVALUATION
Attending Physician:               rudi harley             Procedure: egd    Indication for Procedure: chronic cough, heartburn  ________________________________________________________  PAST MEDICAL & SURGICAL HISTORY:  colonoscopy, hernia repair, knee surgery  gerd, htn, severe TYREE    ALLERGIES:  sulfamethoxazole (Hives)    HOME MEDICATIONS:    mvi  cozaar 100 mg  calcium/d3  AICD/PPM: [ ] yes   [ x] no    PERTINENT LAB DATA:                      PHYSICAL EXAMINATION:    Height (cm): 184  Weight (kg): 105  BMI (kg/m2): 31  BSA (m2): 2.28T(C): --  HR: --  BP: --  RR: --  SpO2: --    Constitutional: NAD    Neck:  No JVD  Respiratory: CTAB/L  Cardiovascular: S1 and S2  Gastrointestinal: BS+, soft, NT/ND  Extremities: No peripheral edema  Neurological: A/O x 3, no focal deficits        COMMENTS:    The patient is a suitable candidate for the planned procedure unless box checked [ ]  No, explain:

## 2023-11-30 ENCOUNTER — OUTPATIENT (OUTPATIENT)
Dept: OUTPATIENT SERVICES | Facility: HOSPITAL | Age: 58
LOS: 1 days | End: 2023-11-30
Payer: COMMERCIAL

## 2023-11-30 ENCOUNTER — TRANSCRIPTION ENCOUNTER (OUTPATIENT)
Age: 58
End: 2023-11-30

## 2023-11-30 VITALS
SYSTOLIC BLOOD PRESSURE: 141 MMHG | RESPIRATION RATE: 20 BRPM | TEMPERATURE: 98 F | WEIGHT: 220.02 LBS | HEART RATE: 67 BPM | OXYGEN SATURATION: 97 % | HEIGHT: 73 IN | DIASTOLIC BLOOD PRESSURE: 77 MMHG

## 2023-11-30 VITALS
DIASTOLIC BLOOD PRESSURE: 73 MMHG | SYSTOLIC BLOOD PRESSURE: 111 MMHG | OXYGEN SATURATION: 98 % | HEART RATE: 55 BPM | RESPIRATION RATE: 16 BRPM

## 2023-11-30 DIAGNOSIS — Z98.890 OTHER SPECIFIED POSTPROCEDURAL STATES: Chronic | ICD-10-CM

## 2023-11-30 DIAGNOSIS — R05.3 CHRONIC COUGH: ICD-10-CM

## 2023-11-30 DIAGNOSIS — Z86.69 PERSONAL HISTORY OF OTHER DISEASES OF THE NERVOUS SYSTEM AND SENSE ORGANS: Chronic | ICD-10-CM

## 2023-11-30 PROCEDURE — 43239 EGD BIOPSY SINGLE/MULTIPLE: CPT

## 2023-11-30 PROCEDURE — 88305 TISSUE EXAM BY PATHOLOGIST: CPT

## 2023-11-30 PROCEDURE — 88305 TISSUE EXAM BY PATHOLOGIST: CPT | Mod: 26

## 2023-11-30 RX ORDER — LOSARTAN POTASSIUM 100 MG/1
1 TABLET, FILM COATED ORAL
Refills: 0 | DISCHARGE

## 2023-11-30 RX ORDER — PANTOPRAZOLE SODIUM 20 MG/1
1 TABLET, DELAYED RELEASE ORAL
Qty: 120 | Refills: 0
Start: 2023-11-30 | End: 2024-01-28

## 2023-11-30 NOTE — ASU DISCHARGE PLAN (ADULT/PEDIATRIC) - ASU DC SPECIAL INSTRUCTIONSFT
start protonix 40 mg twice a day (half hour before breakfast and dinner)  follow up in my office in 2-3 weeks  will need to repeat endoscopy in 2 months to confirm inflammation is healed

## 2023-11-30 NOTE — ASU DISCHARGE PLAN (ADULT/PEDIATRIC) - CARE PROVIDER_API CALL
Dallas Flores  Gastroenterology  [address], [address]  [place] [address]  Phone: [phone]  Fax: [phone]  Established Patient  Follow Up Time: 1 month

## 2023-11-30 NOTE — ASU PREOP CHECKLIST - BSA (M2)
2.24 Island Pedicle Flap With Canthal Suspension Text: The defect edges were debeveled with a #15 scalpel blade.  Given the location of the defect, shape of the defect and the proximity to free margins an island pedicle advancement flap was deemed most appropriate.  Using a sterile surgical marker, an appropriate advancement flap was drawn incorporating the defect, outlining the appropriate donor tissue and placing the expected incisions within the relaxed skin tension lines where possible. The area thus outlined was incised deep to adipose tissue with a #15 scalpel blade.  The skin margins were undermined to an appropriate distance in all directions around the primary defect and laterally outward around the island pedicle utilizing iris scissors.  There was minimal undermining beneath the pedicle flap. A suspension suture was placed in the canthal tendon to prevent tension and prevent ectropion.

## 2023-11-30 NOTE — ASU DISCHARGE PLAN (ADULT/PEDIATRIC) - NS MD DC FALL RISK RISK
For information on Fall & Injury Prevention, visit: [email or web address] OR  [email or web address] OR  https://www.cdc.gov/steadi/patient.html

## 2023-11-30 NOTE — ASU PATIENT PROFILE, ADULT - NSICDXPASTSURGICALHX_GEN_ALL_CORE_FT
PAST SURGICAL HISTORY:  H/O hernia repair     History of sleep apnea     S/P medial meniscus repair of left knee

## 2023-12-04 LAB
SURGICAL PATHOLOGY STUDY: SIGNIFICANT CHANGE UP
SURGICAL PATHOLOGY STUDY: SIGNIFICANT CHANGE UP

## 2024-04-05 ENCOUNTER — OFFICE VISIT (OUTPATIENT)
Dept: URGENT CARE | Facility: CLINIC | Age: 59
End: 2024-04-05
Payer: COMMERCIAL

## 2024-04-05 VITALS
BODY MASS INDEX: 26.6 KG/M2 | HEIGHT: 71 IN | DIASTOLIC BLOOD PRESSURE: 70 MMHG | RESPIRATION RATE: 18 BRPM | WEIGHT: 190 LBS | OXYGEN SATURATION: 100 % | TEMPERATURE: 98.4 F | SYSTOLIC BLOOD PRESSURE: 138 MMHG | HEART RATE: 93 BPM

## 2024-04-05 DIAGNOSIS — J06.9 ACUTE URI: Primary | ICD-10-CM

## 2024-04-05 DIAGNOSIS — H92.09 OTALGIA, UNSPECIFIED LATERALITY: ICD-10-CM

## 2024-04-05 PROCEDURE — 99213 OFFICE O/P EST LOW 20 MIN: CPT | Performed by: PHYSICIAN ASSISTANT

## 2024-04-05 RX ORDER — TIRZEPATIDE 10 MG/.5ML
10 INJECTION, SOLUTION SUBCUTANEOUS
COMMUNITY
Start: 2023-10-12

## 2024-04-05 NOTE — PROGRESS NOTES
[facility]    NAME: Chris Calle is a 58 y.o. male  : 1965    MRN: [id number]  DATE: 2024  TIME: 8:58 AM    Assessment and Plan   Acute URI [J06.9]  1. Acute URI        2. Otalgia, unspecified laterality            Patient Instructions     Patient Instructions   Infection appears viral.  Recommend symptomatic treatment.  Can take ibuprofen or tylenol as needed for pain or fever.  Over the counter cough and cold medications to help with symptoms.  Use salt water gargles for sore throat and throat lozenges.  Cough drops as needed.  Wash hands frequently to prevent the spread of infection.  If not improving over the next 7-10 days, follow up with PCP.  Symptoms may persist for 10-14 days.      Chief Complaint     Chief Complaint   Patient presents with    Cold Like Symptoms     Patient c/o sinus pain and pressure and right ear pain that started yesterday.         History of Present Illness   58-year-old male here with complaint of sinus pain and pressure and right ear pain with nasal congestion and thick mucus.  Symptoms started yesterday.  No fever or chills.  Has a little bit of a headache.  Sore throat is present.  Patient states that he was around a lot of sick people the other day.  Thinks he might have a sinus infection.        Review of Systems   Review of Systems   Constitutional:  Negative for chills, fatigue and fever.   HENT:  Positive for congestion, ear pain, postnasal drip, sinus pressure and sore throat.    Respiratory:  Negative for cough, shortness of breath and wheezing.    Neurological:  Positive for headaches.   All other systems reviewed and are negative.      Current Medications     Current Outpatient Medications:     allopurinol (ZYLOPRIM) 300 mg tablet, allopurinol 300 mg tablet  TAKE 1 TABLET BY MOUTH ONCE DAILY AS DIRECTED, Disp: , Rfl:     metFORMIN (GLUCOPHAGE) 500 mg tablet, Take 500 mg by mouth daily, Disp: , Rfl:     Mounjaro 10 MG/0.5ML, Inject 10 mg under the  skin every 7 days, Disp: , Rfl:     benzonatate (TESSALON PERLES) 100 mg capsule, Take 1 capsule (100 mg total) by mouth 3 (three) times a day as needed for cough (Patient not taking: Reported on 6/10/2023), Disp: 20 capsule, Rfl: 0    escitalopram (LEXAPRO) 20 mg tablet, Take 20 mg by mouth (Patient not taking: Reported on 6/10/2023), Disp: , Rfl:     UNKNOWN TO PATIENT, , Disp: , Rfl:     Current Facility-Administered Medications:     ipratropium-albuterol (DUO-NEB) 0.5-2.5 mg/3 mL inhalation solution 3 mL, 3 mL, Nebulization, Q6H, Chico Delarosa DO, 3 mL at 06/08/18 0856    Current Allergies     Allergies as of 04/05/2024 - Reviewed 04/05/2024   Allergen Reaction Noted    Pollen extract  12/26/2013          The following portions of the patient's history were reviewed and updated as appropriate: allergies, current medications, past family history, past medical history, past social history, past surgical history and problem list.   Past Medical History:   Diagnosis Date    Anxiety     Depression     Diabetes mellitus (HCC)      Past Surgical History:   Procedure Laterality Date    APPENDECTOMY      NASAL POLYP EXCISION      x 2      Family History   Problem Relation Age of Onset    Diabetes Mother     Diabetes Father      Social History     Socioeconomic History    Marital status: /Civil Union     Spouse name: Not on file    Number of children: Not on file    Years of education: Not on file    Highest education level: Not on file   Occupational History    Not on file   Tobacco Use    Smoking status: Former    Smokeless tobacco: Never   Substance and Sexual Activity    Alcohol use: Yes     Comment: social    Drug use: Not on file    Sexual activity: Not on file   Other Topics Concern    Not on file   Social History Narrative    Not on file     Social Determinants of Health     Financial Resource Strain: Not on file   Food Insecurity: Not on file   Transportation Needs: Not on file   Physical Activity: Not  "on file   Stress: Not on file   Social Connections: Not on file   Intimate Partner Violence: Not on file   Housing Stability: Not on file     Medications have been verified.    Objective   /70   Pulse 93   Temp 98.4 °F (36.9 °C) (Temporal)   Resp 18   Ht 5' 11\" (1.803 m)   Wt 86.2 kg (190 lb)   SpO2 100%   BMI 26.50 kg/m²      Physical Exam   Physical Exam  Vitals reviewed.   Constitutional:       General: He is not in acute distress.     Appearance: He is well-developed.   HENT:      Head: Normocephalic and atraumatic.      Right Ear: Tympanic membrane normal.      Left Ear: Tympanic membrane normal.      Nose: Congestion present. No mucosal edema.      Mouth/Throat:      Pharynx: Posterior oropharyngeal erythema present.   Cardiovascular:      Rate and Rhythm: Normal rate and regular rhythm.      Heart sounds: Normal heart sounds.   Pulmonary:      Effort: Pulmonary effort is normal. No respiratory distress.      Breath sounds: Normal breath sounds.                     "

## 2024-04-18 ENCOUNTER — OFFICE VISIT (OUTPATIENT)
Dept: URGENT CARE | Facility: CLINIC | Age: 59
End: 2024-04-18
Payer: COMMERCIAL

## 2024-04-18 VITALS
TEMPERATURE: 97.9 F | SYSTOLIC BLOOD PRESSURE: 137 MMHG | RESPIRATION RATE: 18 BRPM | DIASTOLIC BLOOD PRESSURE: 79 MMHG | HEART RATE: 108 BPM | OXYGEN SATURATION: 100 %

## 2024-04-18 DIAGNOSIS — R06.02 SHORTNESS OF BREATH: Primary | ICD-10-CM

## 2024-04-18 PROCEDURE — 93005 ELECTROCARDIOGRAM TRACING: CPT | Performed by: PHYSICIAN ASSISTANT

## 2024-04-18 PROCEDURE — 99213 OFFICE O/P EST LOW 20 MIN: CPT | Performed by: PHYSICIAN ASSISTANT

## 2024-04-18 NOTE — PROGRESS NOTES
[facility]        NAME: Chris Calle is a 58 y.o. male  : 1965    MRN: [id number]  DATE: 2024  TIME: 9:56 AM    Assessment and Plan   Shortness of breath [R06.02]  1. Shortness of breath  Transfer to other facility        ED for further evaluation/treatment.  EKG: RBBB, 1st degree AV block    Patient Instructions       ED for further evaluation.    If tests have been performed at [facility], our office will contact you with results if changes need to be made to the care plan discussed with you at the visit.  You can review your full results on [facility] MyChart.    Chief Complaint     Chief Complaint   Patient presents with    Shortness of Breath     Was seen on  just still feels fatigue          History of Present Illness       Patient is a 58 year old male presenting to [facility] with shortness of breath and fatigue.  Patient reports symptoms began about 2 weeks ago.  Patient was evaluated in clinic on 2024 and diagnosed with a viral URI.  Fatigue and shortness of breath began 1 week ago.  Pt reports very short winded with minimal activity especially going up stairs and walking with his job as a .     Shortness of Breath  The current episode started in the past 7 days. The problem occurs intermittently. The problem has been gradually worsening since onset. Associated symptoms include fatigue. Pertinent negatives include no chest pain, coughing, palpitations or sore throat.       Review of Systems   Review of Systems   Constitutional:  Positive for fatigue. Negative for chills and fever.   HENT:  Negative for ear pain and sore throat.    Eyes:  Negative for pain and visual disturbance.   Respiratory:  Positive for shortness of breath. Negative for cough.    Cardiovascular:  Negative for chest pain and palpitations.   Gastrointestinal:  Negative for abdominal pain and vomiting.   Genitourinary:  Negative for dysuria and hematuria.   Musculoskeletal:  Negative for  arthralgias and back pain.   Skin:  Negative for color change and rash.   Neurological:  Negative for seizures and syncope.   All other systems reviewed and are negative.        Current Medications       Current Outpatient Medications:     allopurinol (ZYLOPRIM) 300 mg tablet, allopurinol 300 mg tablet  TAKE 1 TABLET BY MOUTH ONCE DAILY AS DIRECTED, Disp: , Rfl:     benzonatate (TESSALON PERLES) 100 mg capsule, Take 1 capsule (100 mg total) by mouth 3 (three) times a day as needed for cough (Patient not taking: Reported on 6/10/2023), Disp: 20 capsule, Rfl: 0    escitalopram (LEXAPRO) 20 mg tablet, Take 20 mg by mouth (Patient not taking: Reported on 6/10/2023), Disp: , Rfl:     metFORMIN (GLUCOPHAGE) 500 mg tablet, Take 500 mg by mouth daily, Disp: , Rfl:     Mounjaro 10 MG/0.5ML, Inject 10 mg under the skin every 7 days, Disp: , Rfl:     UNKNOWN TO PATIENT, , Disp: , Rfl:     Current Facility-Administered Medications:     ipratropium-albuterol (DUO-NEB) 0.5-2.5 mg/3 mL inhalation solution 3 mL, 3 mL, Nebulization, Q6H, Chico Delarosa, DO, 3 mL at 06/08/18 0856    Current Allergies     Allergies as of 04/18/2024 - Reviewed 04/18/2024   Allergen Reaction Noted    Pollen extract  12/26/2013            The following portions of the patient's history were reviewed and updated as appropriate: allergies, current medications, past family history, past medical history, past social history, past surgical history and problem list.     Past Medical History:   Diagnosis Date    Anxiety     Depression     Diabetes mellitus (HCC)        Past Surgical History:   Procedure Laterality Date    APPENDECTOMY      NASAL POLYP EXCISION      x 2        Family History   Problem Relation Age of Onset    Diabetes Mother     Diabetes Father          Medications have been verified.        Objective   /79   Pulse (!) 108   Temp 97.9 °F (36.6 °C)   Resp 18   SpO2 100%   No LMP for male patient.       Physical Exam     Physical  Exam  Constitutional:       Appearance: Normal appearance. He is normal weight.   HENT:      Head: Normocephalic and atraumatic.      Nose: Nose normal.      Mouth/Throat:      Mouth: Mucous membranes are moist.   Eyes:      Extraocular Movements: Extraocular movements intact.      Conjunctiva/sclera: Conjunctivae normal.      Pupils: Pupils are equal, round, and reactive to light.   Cardiovascular:      Rate and Rhythm: Regular rhythm. Tachycardia present.      Heart sounds: No murmur heard.     No friction rub. No gallop.   Pulmonary:      Effort: Pulmonary effort is normal.      Breath sounds: No wheezing, rhonchi or rales.   Musculoskeletal:         General: Normal range of motion.      Cervical back: Normal range of motion and neck supple.   Skin:     General: Skin is warm and dry.   Neurological:      General: No focal deficit present.      Mental Status: He is alert and oriented to person, place, and time.   Psychiatric:         Mood and Affect: Mood normal.         Behavior: Behavior normal.

## 2024-04-19 LAB
ATRIAL RATE: 94 BPM
P AXIS: 63 DEGREES
PR INTERVAL: 210 MS
QRS AXIS: 64 DEGREES
QRSD INTERVAL: 126 MS
QT INTERVAL: 346 MS
QTC INTERVAL: 432 MS
T WAVE AXIS: 51 DEGREES
VENTRICULAR RATE: 94 BPM

## 2024-04-19 PROCEDURE — 93010 ELECTROCARDIOGRAM REPORT: CPT | Performed by: INTERNAL MEDICINE

## 2024-11-25 ENCOUNTER — APPOINTMENT (OUTPATIENT)
Dept: ORTHOPEDIC SURGERY | Facility: CLINIC | Age: 59
End: 2024-11-25
Payer: COMMERCIAL

## 2024-11-25 VITALS — HEIGHT: 72 IN | BODY MASS INDEX: 30.48 KG/M2 | WEIGHT: 225 LBS

## 2024-11-25 DIAGNOSIS — Z87.19 PERSONAL HISTORY OF OTHER DISEASES OF THE DIGESTIVE SYSTEM: ICD-10-CM

## 2024-11-25 DIAGNOSIS — M54.16 RADICULOPATHY, LUMBAR REGION: ICD-10-CM

## 2024-11-25 DIAGNOSIS — M51.369: ICD-10-CM

## 2024-11-25 DIAGNOSIS — G57.12 MERALGIA PARESTHETICA, LEFT LOWER LIMB: ICD-10-CM

## 2024-11-25 PROCEDURE — 72170 X-RAY EXAM OF PELVIS: CPT

## 2024-11-25 PROCEDURE — 72100 X-RAY EXAM L-S SPINE 2/3 VWS: CPT

## 2024-11-25 PROCEDURE — 99214 OFFICE O/P EST MOD 30 MIN: CPT

## 2024-11-25 RX ORDER — METHYLPREDNISOLONE 4 MG/1
4 TABLET ORAL
Qty: 1 | Refills: 1 | Status: ACTIVE | COMMUNITY
Start: 2024-11-25 | End: 1900-01-01

## 2024-11-25 RX ORDER — PANTOPRAZOLE 20 MG/1
20 TABLET, DELAYED RELEASE ORAL
Refills: 0 | Status: ACTIVE | COMMUNITY

## 2024-11-25 RX ORDER — LOSARTAN POTASSIUM 100 MG/1
TABLET, FILM COATED ORAL
Refills: 0 | Status: ACTIVE | COMMUNITY

## 2024-12-02 ENCOUNTER — APPOINTMENT (OUTPATIENT)
Dept: NEUROLOGY | Facility: CLINIC | Age: 59
End: 2024-12-02
Payer: COMMERCIAL

## 2024-12-02 PROCEDURE — 95912 NRV CNDJ TEST 11-12 STUDIES: CPT

## 2024-12-02 PROCEDURE — 95886 MUSC TEST DONE W/N TEST COMP: CPT

## 2024-12-10 ENCOUNTER — APPOINTMENT (OUTPATIENT)
Dept: PHYSICAL THERAPY | Facility: CLINIC | Age: 59
End: 2024-12-10

## 2024-12-10 ENCOUNTER — APPOINTMENT (OUTPATIENT)
Dept: URGENT CARE | Facility: CLINIC | Age: 59
End: 2024-12-10

## 2024-12-10 PROCEDURE — 97530 THERAPEUTIC ACTIVITIES: CPT

## 2024-12-12 ENCOUNTER — APPOINTMENT (OUTPATIENT)
Dept: ORTHOPEDIC SURGERY | Facility: CLINIC | Age: 59
End: 2024-12-12
Payer: COMMERCIAL

## 2024-12-12 DIAGNOSIS — G57.12 MERALGIA PARESTHETICA, LEFT LOWER LIMB: ICD-10-CM

## 2024-12-12 DIAGNOSIS — M54.16 RADICULOPATHY, LUMBAR REGION: ICD-10-CM

## 2024-12-12 PROCEDURE — 99213 OFFICE O/P EST LOW 20 MIN: CPT

## 2025-02-19 ENCOUNTER — APPOINTMENT (OUTPATIENT)
Dept: PHYSICAL THERAPY | Facility: CLINIC | Age: 60
End: 2025-02-19

## 2025-02-19 ENCOUNTER — APPOINTMENT (OUTPATIENT)
Dept: URGENT CARE | Facility: CLINIC | Age: 60
End: 2025-02-19

## 2025-02-19 PROCEDURE — 97530 THERAPEUTIC ACTIVITIES: CPT | Performed by: PHYSICAL THERAPIST

## 2025-05-30 ENCOUNTER — APPOINTMENT (OUTPATIENT)
Dept: ORTHOPEDIC SURGERY | Facility: CLINIC | Age: 60
End: 2025-05-30
Payer: COMMERCIAL

## 2025-05-30 VITALS — BODY MASS INDEX: 30.48 KG/M2 | WEIGHT: 225 LBS | HEIGHT: 72 IN

## 2025-05-30 DIAGNOSIS — Z86.69 PERSONAL HISTORY OF OTHER DISEASES OF THE NERVOUS SYSTEM AND SENSE ORGANS: ICD-10-CM

## 2025-05-30 DIAGNOSIS — M17.12 UNILATERAL PRIMARY OSTEOARTHRITIS, LEFT KNEE: ICD-10-CM

## 2025-05-30 PROCEDURE — 73562 X-RAY EXAM OF KNEE 3: CPT | Mod: 50

## 2025-05-30 PROCEDURE — 99214 OFFICE O/P EST MOD 30 MIN: CPT | Mod: 25

## 2025-05-30 PROCEDURE — 20610 DRAIN/INJ JOINT/BURSA W/O US: CPT | Mod: LT

## 2025-06-29 ENCOUNTER — OFFICE VISIT (OUTPATIENT)
Dept: URGENT CARE | Facility: CLINIC | Age: 60
End: 2025-06-29
Payer: COMMERCIAL

## 2025-06-29 VITALS
HEART RATE: 92 BPM | DIASTOLIC BLOOD PRESSURE: 80 MMHG | OXYGEN SATURATION: 100 % | TEMPERATURE: 97.9 F | SYSTOLIC BLOOD PRESSURE: 118 MMHG | RESPIRATION RATE: 18 BRPM

## 2025-06-29 DIAGNOSIS — M25.532 LEFT WRIST PAIN: Primary | ICD-10-CM

## 2025-06-29 PROCEDURE — 99213 OFFICE O/P EST LOW 20 MIN: CPT

## 2025-06-29 RX ORDER — ERGOCALCIFEROL 1.25 MG/1
50000 CAPSULE, LIQUID FILLED ORAL 2 TIMES WEEKLY
COMMUNITY
Start: 2025-05-25

## 2025-06-29 RX ORDER — METHYLPREDNISOLONE 4 MG/1
TABLET ORAL
Qty: 21 EACH | Refills: 0 | Status: SHIPPED | OUTPATIENT
Start: 2025-06-29

## 2025-06-29 NOTE — PATIENT INSTRUCTIONS
Please take Medrol Dosepak daily for the next 5 days as prescribed.  Please take steroids with food and do not take any Ibuprofen or other NSAID containing medications as this may increase the risk of GI bleeding. You may take Tylenol if needed.     Please rest the affected area.  You may apply ice or moist heat 4 times daily as needed for any pain or swelling.    If your symptoms do not improve with our current treatment plan or worsen, please schedule an appointment with your PCP. If you develop any high or persistent fevers, chest pain, palpitations, shortness of breath, difficulty swallowing, decreased urine output, abdominal pain, dizziness or any other concerning symptoms, please proceed to the ED.

## 2025-06-29 NOTE — PROGRESS NOTES
[facility]        NAME: Chris Calle is a 60 y.o. male  : 1965    MRN: [id number]  DATE: 2025  TIME: 9:20 AM    Assessment and Plan   Left wrist pain [M25.532]  1. Left wrist pain  methylPREDNISolone 4 MG tablet therapy pack        Discussed with patient symptoms are most consistent with possible gout flare versus arthritis given recent overuse.  Will treat with Medrol Dosepak.  Recommended supportive care with OTC Tylenol and ice/moist heat as needed for pain or discomfort.  Instructed patient to rest the affected area. Instructed patient to follow-up with PCP for no improvement or worsening of symptoms.  Patient educated on red flag symptoms and when to proceed to the ED.  Patient agreeable and understands current treatment plan.     Patient Instructions     Patient Instructions   Please take Medrol Dosepak daily for the next 5 days as prescribed.  Please take steroids with food and do not take any Ibuprofen or other NSAID containing medications as this may increase the risk of GI bleeding. You may take Tylenol if needed.     Please rest the affected area.  You may apply ice or moist heat 4 times daily as needed for any pain or swelling.    If your symptoms do not improve with our current treatment plan or worsen, please schedule an appointment with your PCP. If you develop any high or persistent fevers, chest pain, palpitations, shortness of breath, difficulty swallowing, decreased urine output, abdominal pain, dizziness or any other concerning symptoms, please proceed to the ED.       Follow up with PCP in 3-5 days.  Proceed to  ER if symptoms worsen.    Chief Complaint     Chief Complaint   Patient presents with   • Wrist Pain     C/O left wrist pain 10 days. Denies injury does have hx of gout          History of Present Illness       60-year-old male presents to the clinic for evaluation of left-sided wrist pain x 10 days.  Patient denies any history of trauma or injury to the wrist  however states his pain has been persistent over the past 10 days.  He rates the pain an 8 out of 10 and describes it as both dull and aching and sharp and shooting.  He reports the pain is constant and is aggravated by movements and even touching the area.  He reports his wrist is also swollen, but he denies any redness, numbness, tingling or weakness.  Patient reports he has full range of motion to the left wrist however states a lot of pain when he is moving it.  He reports he has been taking OTC Aleve and icing the affected area with mild relief of symptoms.  Of note, patient reports a history of gout in the past.  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            Review of Systems   Review of Systems   Constitutional:  Negative for chills and fever.   Respiratory:  Negative for cough and shortness of breath.    Cardiovascular:  Negative for chest pain and palpitations.   Gastrointestinal:  Negative for diarrhea, nausea and vomiting.   Musculoskeletal:  Positive for arthralgias (left wrist) and joint swelling.   Skin:  Negative for color change and wound.   Neurological:  Negative for dizziness, weakness, numbness and headaches.   All other systems reviewed and are negative.        Current Medications     Current Medications[1]    Current Allergies     Allergies as of 06/29/2025 - Reviewed 06/29/2025   Allergen Reaction Noted   • Pollen extract  12/26/2013            The following portions of the patient's history were reviewed and updated as appropriate: allergies, current medications, past family history, past medical history, past social history, past surgical history and problem list.     Past Medical History[2]    Past Surgical History[3]    Family History[4]      Medications have been verified.        Objective   /80   Pulse 92   Temp 97.9 °F (36.6 °C)   Resp 18   SpO2 100%         Physical Exam     Physical Exam  Vitals and nursing note reviewed.   Constitutional:       Appearance: Normal appearance.   HENT:      Head: Normocephalic and atraumatic.     Cardiovascular:      Rate and Rhythm: Normal rate and regular rhythm.      Heart sounds: Normal heart sounds.   Pulmonary:      Effort: Pulmonary effort is normal.      Breath sounds: Normal breath sounds.     Musculoskeletal:      Right wrist: Normal.      Left wrist: Swelling and tenderness present. No deformity, effusion, lacerations, bony tenderness, snuff box tenderness or crepitus. Decreased range of motion. Normal pulse.      Right hand: Normal.      Left hand: Normal.      Comments: No erythema or ecchymosis; left wrist is mildly edematous; patient is tender to palpation to the dorsal and ventral aspect of the left wrist; overall decreased ROM due to subjective pain with movement; patient is neurovascularly intact with good pulses, capillary refill and sensation     Skin:     General: Skin is warm and dry.      Capillary Refill: Capillary refill takes less than 2 seconds.      Findings: No erythema.     Neurological:      General: No focal deficit present.      Mental Status: He is alert and oriented to person, place, and time.     Psychiatric:         Mood and Affect: Mood normal.         Behavior: Behavior normal.                          [1]    Current Outpatient Medications:   •  allopurinol (ZYLOPRIM) 300 mg tablet, , Disp: , Rfl:   •  benzonatate (TESSALON PERLES) 100 mg capsule, Take 1 capsule (100 mg total) by mouth 3 (three) times a day as needed for cough, Disp: 20 capsule, Rfl: 0  •  ergocalciferol (VITAMIN D2) 50,000 units, Take 50,000 Units by mouth 2 (two) times a week, Disp: , Rfl:   •  escitalopram (LEXAPRO) 20 mg tablet, Take 20 mg by mouth, Disp: , Rfl:   •  metFORMIN (GLUCOPHAGE) 500 mg tablet, Take 500 mg by mouth in the morning., Disp: , Rfl:   •  methylPREDNISolone 4 MG tablet therapy pack, Use as directed on  package, Disp: 21 each, Rfl: 0  •  Mounjaro 10 MG/0.5ML, Inject 10 mg under the skin every 7 days, Disp: , Rfl:   •  UNKNOWN TO PATIENT, , Disp: , Rfl:     Current Facility-Administered Medications:   •  ipratropium-albuterol (DUO-NEB) 0.5-2.5 mg/3 mL inhalation solution 3 mL, 3 mL, Nebulization, Q6H, Chico Delarosa DO, 3 mL at 06/08/18 0856[2]  Past Medical History:  Diagnosis Date   • Anxiety    • Depression    • Diabetes mellitus (HCC)    [3]  Past Surgical History:  Procedure Laterality Date   • APPENDECTOMY     • NASAL POLYP EXCISION      x 2    [4]  Family History  Problem Relation Name Age of Onset   • Diabetes Mother     • Diabetes Father

## 2025-09-02 ENCOUNTER — APPOINTMENT (OUTPATIENT)
Dept: ORTHOPEDIC SURGERY | Facility: CLINIC | Age: 60
End: 2025-09-02
Payer: COMMERCIAL

## 2025-09-02 VITALS — WEIGHT: 225 LBS | HEIGHT: 72 IN | BODY MASS INDEX: 30.48 KG/M2

## 2025-09-02 DIAGNOSIS — Z78.9 OTHER SPECIFIED HEALTH STATUS: ICD-10-CM

## 2025-09-02 PROCEDURE — 99214 OFFICE O/P EST MOD 30 MIN: CPT | Mod: 25

## 2025-09-02 PROCEDURE — 20610 DRAIN/INJ JOINT/BURSA W/O US: CPT | Mod: RT

## 2025-09-02 RX ORDER — METHYLPREDNISOLONE 4 MG/1
4 TABLET ORAL
Qty: 1 | Refills: 1 | Status: ACTIVE | COMMUNITY
Start: 2025-09-02 | End: 1900-01-01

## 2025-09-09 ENCOUNTER — APPOINTMENT (OUTPATIENT)
Dept: ORTHOPEDIC SURGERY | Facility: CLINIC | Age: 60
End: 2025-09-09
Payer: COMMERCIAL

## 2025-09-09 VITALS — WEIGHT: 225 LBS | BODY MASS INDEX: 30.48 KG/M2 | HEIGHT: 72 IN

## 2025-09-09 DIAGNOSIS — M17.12 UNILATERAL PRIMARY OSTEOARTHRITIS, LEFT KNEE: ICD-10-CM

## 2025-09-09 PROBLEM — M23.91 INTERNAL DERANGEMENT OF RIGHT KNEE: Status: ACTIVE | Noted: 2025-09-02

## 2025-09-09 PROCEDURE — 99213 OFFICE O/P EST LOW 20 MIN: CPT

## 2025-09-12 ENCOUNTER — RESULT REVIEW (OUTPATIENT)
Age: 60
End: 2025-09-12

## 2025-09-15 ENCOUNTER — APPOINTMENT (OUTPATIENT)
Dept: ORTHOPEDIC SURGERY | Facility: CLINIC | Age: 60
End: 2025-09-15
Payer: COMMERCIAL

## 2025-09-15 ENCOUNTER — NON-APPOINTMENT (OUTPATIENT)
Age: 60
End: 2025-09-15

## 2025-09-15 VITALS — BODY MASS INDEX: 30.48 KG/M2 | WEIGHT: 225 LBS | HEIGHT: 72 IN

## 2025-09-15 DIAGNOSIS — M23.91 UNSPECIFIED INTERNAL DERANGEMENT OF RIGHT KNEE: ICD-10-CM

## 2025-09-15 DIAGNOSIS — M22.41 CHONDROMALACIA PATELLAE, RIGHT KNEE: ICD-10-CM

## 2025-09-15 PROCEDURE — 20610 DRAIN/INJ JOINT/BURSA W/O US: CPT | Mod: RT

## 2025-09-15 PROCEDURE — 99213 OFFICE O/P EST LOW 20 MIN: CPT | Mod: 25

## (undated) DEVICE — TUBING IV SET GRAVITY 3Y 100" MACRO

## (undated) DEVICE — FOLEY HOLDER STATLOCK 2 WAY ADULT

## (undated) DEVICE — PACK IV START WITH CHG

## (undated) DEVICE — TUBING SUCTION 20FT

## (undated) DEVICE — TUBING SUCTION CONN 6FT STERILE

## (undated) DEVICE — SOL INJ NS 0.9% 500ML 2 PORT

## (undated) DEVICE — SUCTION YANKAUER NO CONTROL VENT

## (undated) DEVICE — BITE BLOCK ADULT 20 X 27MM (GREEN)

## (undated) DEVICE — SENSOR O2 FINGER ADULT

## (undated) DEVICE — BIOPSY FORCEP RADIAL JAW 4 STANDARD WITH NEEDLE

## (undated) DEVICE — BALLOON US ENDO

## (undated) DEVICE — CATH IV SAFE BC 20G X 1.16" (PINK)

## (undated) DEVICE — SYR ALLIANCE II INFLATION 60ML

## (undated) DEVICE — CATH IV SAFE BC 22G X 1" (BLUE)